# Patient Record
Sex: MALE | Race: WHITE | NOT HISPANIC OR LATINO | Employment: FULL TIME | URBAN - METROPOLITAN AREA
[De-identification: names, ages, dates, MRNs, and addresses within clinical notes are randomized per-mention and may not be internally consistent; named-entity substitution may affect disease eponyms.]

---

## 2023-12-14 DIAGNOSIS — N18.4 CHRONIC KIDNEY DISEASE, STAGE IV (SEVERE): ICD-10-CM

## 2023-12-14 DIAGNOSIS — Z01.818 OTHER SPECIFIED PRE-OPERATIVE EXAMINATION: Primary | ICD-10-CM

## 2023-12-14 DIAGNOSIS — N18.4 CHRONIC KIDNEY DISEASE, STAGE IV (SEVERE): Primary | ICD-10-CM

## 2023-12-14 RX ORDER — SEVELAMER CARBONATE 800 MG/1
800 TABLET, FILM COATED ORAL 3 TIMES DAILY
COMMUNITY
Start: 2023-11-20

## 2023-12-14 RX ORDER — ESZOPICLONE 3 MG/1
3 TABLET, FILM COATED ORAL NIGHTLY
COMMUNITY
Start: 2023-11-20

## 2023-12-14 RX ORDER — FLUOROMETHOLONE 1 MG/ML
1 SUSPENSION/ DROPS OPHTHALMIC 2 TIMES DAILY
COMMUNITY
Start: 2023-10-24

## 2023-12-14 RX ORDER — FINASTERIDE 5 MG/1
5 TABLET, FILM COATED ORAL DAILY
COMMUNITY
Start: 2023-10-18

## 2023-12-14 RX ORDER — LINAGLIPTIN 5 MG/1
5 TABLET, FILM COATED ORAL EVERY MORNING
COMMUNITY
Start: 2023-11-03

## 2023-12-14 RX ORDER — INSULIN DEGLUDEC 200 U/ML
20 INJECTION, SOLUTION SUBCUTANEOUS 3 TIMES DAILY
COMMUNITY
Start: 2023-09-14

## 2023-12-14 RX ORDER — DOXAZOSIN 2 MG/1
4 TABLET ORAL 2 TIMES DAILY
COMMUNITY
Start: 2023-12-06

## 2023-12-14 RX ORDER — FLASH GLUCOSE SENSOR
KIT MISCELLANEOUS
Status: ON HOLD | COMMUNITY
Start: 2023-11-22 | End: 2024-03-25 | Stop reason: CLARIF

## 2023-12-14 RX ORDER — AMLODIPINE BESYLATE 10 MG/1
10 TABLET ORAL DAILY
COMMUNITY
Start: 2023-09-21

## 2023-12-14 RX ORDER — HYDROXYZINE HYDROCHLORIDE 25 MG/1
25 TABLET, FILM COATED ORAL NIGHTLY
COMMUNITY
Start: 2023-11-20

## 2023-12-14 RX ORDER — METHYLPREDNISOLONE 4 MG/1
TABLET ORAL
COMMUNITY
Start: 2023-11-28 | End: 2023-12-26

## 2023-12-14 RX ORDER — CARVEDILOL 25 MG/1
25 TABLET ORAL 2 TIMES DAILY
COMMUNITY
Start: 2023-11-08

## 2023-12-14 RX ORDER — PATIROMER 8.4 G/1
8.4 POWDER, FOR SUSPENSION ORAL EVERY OTHER DAY
COMMUNITY
Start: 2023-12-11

## 2023-12-14 RX ORDER — ATORVASTATIN CALCIUM 40 MG/1
40 TABLET, FILM COATED ORAL NIGHTLY
COMMUNITY
Start: 2023-11-27

## 2023-12-14 RX ORDER — HYDROXYZINE PAMOATE 25 MG/1
25 CAPSULE ORAL 2 TIMES DAILY
COMMUNITY
Start: 2023-11-28

## 2023-12-14 RX ORDER — OLMESARTAN MEDOXOMIL 40 MG/1
40 TABLET ORAL DAILY
COMMUNITY
Start: 2023-10-25

## 2023-12-19 NOTE — PROGRESS NOTES
"      [facility] Vascular - Clinic Note  Ady Munoz MD      Patient Name: Leobardo Garcia                   : 1950     MRN: [id number]   Visit Date: 2023          History Present Illness     Reason for Visit: Dialysis Access Evaluation    Mr. Garcia presents to the clinic in need of permanent access creation for hemodialysis. He is being referred by his nephrologist, Dr. Berger. Most recent GFR is 11 (recently decreased from 15). He is not on hemodialysis at this time. Vein mapping of his left arm obtained today as he is right handed. Denies history of pacemaker/ICD or mediport. He is s/p cardiac stenting in 2016. He states he could walk approximately a mile without significant issues.      REVIEW OF SYSTEMS:  12 point review of systems conducted, negative except as stated in the history of present illness. See HPI for details.        Physical Exam      Vitals:    23 1341 23 1344   BP: (!) 195/75 (!) 187/81   BP Location: Right forearm Left arm   Pulse: 72 60   Weight:  109.8 kg (242 lb)   Height:  6' 4" (1.93 m)        General: well-nourished, no acute distress, and healthy appearing, ambulating normally, alert, pleasant, conversant, and oriented  Neurologic: cranial nerves are grossly intact, no neurologic deficits, no motor deficits, and no sensory deficits  HEENT: grossly normal and no scleral icterus  Neck/Chest: normal  and soft without lymphadenopathy  Respiratory: breathing easily and without respiratory distress  Abdomen: normal and soft  Cardiology: regular rate and rhythm    Upper Extremity Arterial Exam:   Right - radial is palpable  Left - radial is palpable    Dialysis Access:  none    Musculoskeletal:   Upper Extremity: normal bilateral hand function  Lower Extremity: trace edema present to bilateral lower extremities          Assessment and Plan     Mr. Garcia is a 73 y.o. with chronic kidney disease who needs permanent access creation. He is not currently on " dialysis, however most recent GFR is 11. Vein mapping obtained today demonstrates anatomy suitable for a LEFT ARM ACCESS CREATION- LIKELY BASILIC TRANSPOSITION. We discussed the risks and benefits of surgery at length including the risks of bleeding, infection, failure of access to mature, neurovascular injury, and/or steal syndrome. He wishes to proceed.         1. Chronic kidney disease, stage IV (severe)  - Ambulatory referral/consult to Vascular Surgery  - Basic Metabolic Panel; Future  - CBC Auto Differential; Future  - EKG 12-lead; Future  - X-Ray Chest PA And Lateral Pre-OP; Future           Imaging Obtained/Reviewed   Study: left upper extremity vein mapping  Date:   12/26/23           Medical History     Past Medical History:   Diagnosis Date    CAD (coronary artery disease)     CKD (chronic kidney disease)     Colon cancer     Diabetes     HTN (hypertension)     Tinnitus, unspecified ear      Past Surgical History:   Procedure Laterality Date    CATARACT EXTRACTION      COLON RESECTION       Family History   Problem Relation Age of Onset    Kidney disease Maternal Grandmother      Social History     Socioeconomic History    Marital status: Unknown   Tobacco Use    Smoking status: Every Day     Current packs/day: 0.50     Types: Cigarettes    Smokeless tobacco: Never     Current Outpatient Medications   Medication Instructions    amLODIPine (NORVASC) 10 mg, Oral    aspirin (ECOTRIN) 81 MG EC tablet 1 tab(s) orally once a day    atorvastatin (LIPITOR) 40 mg, Oral, Nightly    carvediloL (COREG) 25 mg, Oral, 2 times daily    doxazosin (CARDURA) 4 mg, Oral, 2 times daily    eszopiclone (LUNESTA) 3 mg, Oral, Nightly    finasteride (PROSCAR) 5 mg, Oral    fluorometholone 0.1% (FML) 0.1 % DrpS 1 drop, Both Eyes, 2 times daily    FREESTYLE ANG 2 SENSOR Kit CHANGE SENSOR EVERY 14 DAYS    hydrOXYzine HCL (ATARAX) 25 mg, Oral, Nightly    hydrOXYzine pamoate (VISTARIL) 25 mg, Oral, 4 times daily PRN    melatonin  (MELATIN) 5 mg, Oral, Nightly    olmesartan (BENICAR) 40 mg, Oral    sevelamer carbonate (RENVELA) 800 mg, Oral, 3 times daily    TRADJENTA 5 mg, Oral, Every morning    TRESIBA FLEXTOUCH U-200 200 unit/mL (3 mL) insulin pen SMARTSI Unit(s) SUB-Q Daily    VELTASSA 8.4 gram PwPk Oral     Review of patient's allergies indicates:   Allergen Reactions    Lisinopril Other (See Comments)     Other Reaction(s): Cough       Patient Care Team:  Bassam Birmingham III, MD as PCP - General (Family Medicine)      No follow-ups on file. In addition to their scheduled follow up, the patient has also been instructed to follow up on as needed basis.     No future appointments.

## 2023-12-19 NOTE — H&P (VIEW-ONLY)
"      [facility] Vascular - Clinic Note  Ady Munoz MD      Patient Name: Leobardo Garcia                   : 1950     MRN: [id number]   Visit Date: 2023          History Present Illness     Reason for Visit: Dialysis Access Evaluation    Mr. Garcia presents to the clinic in need of permanent access creation for hemodialysis. He is being referred by his nephrologist, Dr. Berger. Most recent GFR is 11 (recently decreased from 15). He is not on hemodialysis at this time. Vein mapping of his left arm obtained today as he is right handed. Denies history of pacemaker/ICD or mediport. He is s/p cardiac stenting in 2016. He states he could walk approximately a mile without significant issues.      REVIEW OF SYSTEMS:  12 point review of systems conducted, negative except as stated in the history of present illness. See HPI for details.        Physical Exam      Vitals:    23 1341 23 1344   BP: (!) 195/75 (!) 187/81   BP Location: Right forearm Left arm   Pulse: 72 60   Weight:  109.8 kg (242 lb)   Height:  6' 4" (1.93 m)        General: well-nourished, no acute distress, and healthy appearing, ambulating normally, alert, pleasant, conversant, and oriented  Neurologic: cranial nerves are grossly intact, no neurologic deficits, no motor deficits, and no sensory deficits  HEENT: grossly normal and no scleral icterus  Neck/Chest: normal  and soft without lymphadenopathy  Respiratory: breathing easily and without respiratory distress  Abdomen: normal and soft  Cardiology: regular rate and rhythm    Upper Extremity Arterial Exam:   Right - radial is palpable  Left - radial is palpable    Dialysis Access:  none    Musculoskeletal:   Upper Extremity: normal bilateral hand function  Lower Extremity: trace edema present to bilateral lower extremities          Assessment and Plan     Mr. Garcia is a 73 y.o. with chronic kidney disease who needs permanent access creation. He is not currently on " dialysis, however most recent GFR is 11. Vein mapping obtained today demonstrates anatomy suitable for a LEFT ARM ACCESS CREATION- LIKELY BASILIC TRANSPOSITION. We discussed the risks and benefits of surgery at length including the risks of bleeding, infection, failure of access to mature, neurovascular injury, and/or steal syndrome. He wishes to proceed.         1. Chronic kidney disease, stage IV (severe)  - Ambulatory referral/consult to Vascular Surgery  - Basic Metabolic Panel; Future  - CBC Auto Differential; Future  - EKG 12-lead; Future  - X-Ray Chest PA And Lateral Pre-OP; Future           Imaging Obtained/Reviewed   Study: left upper extremity vein mapping  Date:   12/26/23           Medical History     Past Medical History:   Diagnosis Date    CAD (coronary artery disease)     CKD (chronic kidney disease)     Colon cancer     Diabetes     HTN (hypertension)     Tinnitus, unspecified ear      Past Surgical History:   Procedure Laterality Date    CATARACT EXTRACTION      COLON RESECTION       Family History   Problem Relation Age of Onset    Kidney disease Maternal Grandmother      Social History     Socioeconomic History    Marital status: Unknown   Tobacco Use    Smoking status: Every Day     Current packs/day: 0.50     Types: Cigarettes    Smokeless tobacco: Never     Current Outpatient Medications   Medication Instructions    amLODIPine (NORVASC) 10 mg, Oral    aspirin (ECOTRIN) 81 MG EC tablet 1 tab(s) orally once a day    atorvastatin (LIPITOR) 40 mg, Oral, Nightly    carvediloL (COREG) 25 mg, Oral, 2 times daily    doxazosin (CARDURA) 4 mg, Oral, 2 times daily    eszopiclone (LUNESTA) 3 mg, Oral, Nightly    finasteride (PROSCAR) 5 mg, Oral    fluorometholone 0.1% (FML) 0.1 % DrpS 1 drop, Both Eyes, 2 times daily    FREESTYLE ANG 2 SENSOR Kit CHANGE SENSOR EVERY 14 DAYS    hydrOXYzine HCL (ATARAX) 25 mg, Oral, Nightly    hydrOXYzine pamoate (VISTARIL) 25 mg, Oral, 4 times daily PRN    melatonin  (MELATIN) 5 mg, Oral, Nightly    olmesartan (BENICAR) 40 mg, Oral    sevelamer carbonate (RENVELA) 800 mg, Oral, 3 times daily    TRADJENTA 5 mg, Oral, Every morning    TRESIBA FLEXTOUCH U-200 200 unit/mL (3 mL) insulin pen SMARTSI Unit(s) SUB-Q Daily    VELTASSA 8.4 gram PwPk Oral     Review of patient's allergies indicates:   Allergen Reactions    Lisinopril Other (See Comments)     Other Reaction(s): Cough       Patient Care Team:  Bassam Birmingham III, MD as PCP - General (Family Medicine)      No follow-ups on file. In addition to their scheduled follow up, the patient has also been instructed to follow up on as needed basis.     No future appointments.

## 2023-12-26 ENCOUNTER — OFFICE VISIT (OUTPATIENT)
Dept: VASCULAR SURGERY | Facility: CLINIC | Age: 73
End: 2023-12-26
Payer: MEDICARE

## 2023-12-26 VITALS
DIASTOLIC BLOOD PRESSURE: 81 MMHG | SYSTOLIC BLOOD PRESSURE: 187 MMHG | HEIGHT: 76 IN | BODY MASS INDEX: 29.47 KG/M2 | WEIGHT: 242 LBS | HEART RATE: 60 BPM

## 2023-12-26 DIAGNOSIS — N18.4 CHRONIC KIDNEY DISEASE, STAGE IV (SEVERE): ICD-10-CM

## 2023-12-26 DIAGNOSIS — N18.5 CHRONIC KIDNEY DISEASE, STAGE V: Primary | ICD-10-CM

## 2023-12-26 PROCEDURE — 99204 OFFICE O/P NEW MOD 45 MIN: CPT | Mod: ,,, | Performed by: SPECIALIST

## 2023-12-26 PROCEDURE — 99204 PR OFFICE/OUTPT VISIT, NEW, LEVL IV, 45-59 MIN: ICD-10-PCS | Mod: ,,, | Performed by: SPECIALIST

## 2023-12-26 RX ORDER — ASPIRIN 81 MG/1
81 TABLET ORAL DAILY
COMMUNITY

## 2023-12-26 RX ORDER — HYDROCODONE BITARTRATE AND ACETAMINOPHEN 7.5; 325 MG/1; MG/1
1 TABLET ORAL EVERY 6 HOURS PRN
Qty: 28 TABLET | Refills: 0 | Status: SHIPPED | OUTPATIENT
Start: 2023-12-26 | End: 2024-03-05

## 2023-12-26 RX ORDER — ACETAMINOPHEN 500 MG
5 TABLET ORAL NIGHTLY
COMMUNITY

## 2023-12-27 RX ORDER — SODIUM CHLORIDE 9 MG/ML
INJECTION, SOLUTION INTRAVENOUS CONTINUOUS
Status: CANCELLED | OUTPATIENT
Start: 2023-12-27

## 2024-01-10 ENCOUNTER — ANESTHESIA EVENT (OUTPATIENT)
Dept: SURGERY | Facility: HOSPITAL | Age: 74
End: 2024-01-10
Payer: COMMERCIAL

## 2024-01-10 RX ORDER — FOLIC ACID 0.8 MG
800 TABLET ORAL DAILY
COMMUNITY

## 2024-01-10 RX ORDER — ASCORBIC ACID 500 MG
500 TABLET ORAL DAILY
COMMUNITY

## 2024-01-10 RX ORDER — MULTIVIT WITH MINERALS/HERBS
1 TABLET ORAL DAILY
COMMUNITY

## 2024-01-10 RX ORDER — FUROSEMIDE 40 MG/1
80 TABLET ORAL DAILY PRN
COMMUNITY
End: 2024-04-09

## 2024-01-10 RX ORDER — CHOLECALCIFEROL (VITAMIN D3) 25 MCG
1000 TABLET ORAL DAILY
COMMUNITY

## 2024-01-17 ENCOUNTER — HOSPITAL ENCOUNTER (OUTPATIENT)
Facility: HOSPITAL | Age: 74
Discharge: HOME | End: 2024-01-17
Attending: SPECIALIST | Admitting: SPECIALIST
Payer: COMMERCIAL

## 2024-01-17 ENCOUNTER — ANESTHESIA (OUTPATIENT)
Dept: SURGERY | Facility: HOSPITAL | Age: 74
End: 2024-01-17
Payer: COMMERCIAL

## 2024-01-17 DIAGNOSIS — N18.5 CHRONIC KIDNEY DISEASE, STAGE V: ICD-10-CM

## 2024-01-17 LAB
ANION GAP SERPL CALC-SCNC: 13 MEQ/L
BUN SERPL-MCNC: 72.4 MG/DL (ref 8.4–25.7)
CALCIUM SERPL-MCNC: 8.2 MG/DL (ref 8.8–10)
CHLORIDE SERPL-SCNC: 112 MMOL/L (ref 98–107)
CO2 SERPL-SCNC: 22 MMOL/L (ref 23–31)
CREAT SERPL-MCNC: 6.1 MG/DL (ref 0.73–1.18)
CREAT/UREA NIT SERPL: 12
GFR SERPLBLD CREATININE-BSD FMLA CKD-EPI: 9 MLS/MIN/1.73/M2
GLUCOSE SERPL-MCNC: 122 MG/DL (ref 82–115)
POTASSIUM SERPL-SCNC: 4.5 MMOL/L (ref 3.5–5.1)
SODIUM SERPL-SCNC: 147 MMOL/L (ref 136–145)

## 2024-01-17 PROCEDURE — 63600175 PHARM REV CODE 636 W HCPCS: Performed by: ANESTHESIOLOGY

## 2024-01-17 PROCEDURE — 25000242 PHARM REV CODE 250 ALT 637 W/ HCPCS: Performed by: ANESTHESIOLOGY

## 2024-01-17 PROCEDURE — 37000009 HC ANESTHESIA EA ADD 15 MINS: Performed by: SPECIALIST

## 2024-01-17 PROCEDURE — 71000016 HC POSTOP RECOV ADDL HR: Performed by: SPECIALIST

## 2024-01-17 PROCEDURE — 80048 BASIC METABOLIC PNL TOTAL CA: CPT | Performed by: SPECIALIST

## 2024-01-17 PROCEDURE — 25000003 PHARM REV CODE 250: Performed by: ANESTHESIOLOGY

## 2024-01-17 PROCEDURE — 36821 AV FUSION DIRECT ANY SITE: CPT | Mod: LT,,, | Performed by: SPECIALIST

## 2024-01-17 PROCEDURE — D9220A PRA ANESTHESIA: Mod: CRNA,,, | Performed by: NURSE ANESTHETIST, CERTIFIED REGISTERED

## 2024-01-17 PROCEDURE — 71000015 HC POSTOP RECOV 1ST HR: Performed by: SPECIALIST

## 2024-01-17 PROCEDURE — 36000706: Performed by: SPECIALIST

## 2024-01-17 PROCEDURE — 76942 ECHO GUIDE FOR BIOPSY: CPT | Performed by: ANESTHESIOLOGY

## 2024-01-17 PROCEDURE — 36000707: Performed by: SPECIALIST

## 2024-01-17 PROCEDURE — 63600175 PHARM REV CODE 636 W HCPCS: Performed by: SPECIALIST

## 2024-01-17 PROCEDURE — 25000003 PHARM REV CODE 250: Performed by: SPECIALIST

## 2024-01-17 PROCEDURE — 37000008 HC ANESTHESIA 1ST 15 MINUTES: Performed by: SPECIALIST

## 2024-01-17 PROCEDURE — D9220A PRA ANESTHESIA: Mod: ANES,,, | Performed by: ANESTHESIOLOGY

## 2024-01-17 RX ORDER — TRAMADOL HYDROCHLORIDE 50 MG/1
50 TABLET ORAL
Status: COMPLETED | OUTPATIENT
Start: 2024-01-17 | End: 2024-01-17

## 2024-01-17 RX ORDER — IPRATROPIUM BROMIDE AND ALBUTEROL SULFATE 2.5; .5 MG/3ML; MG/3ML
3 SOLUTION RESPIRATORY (INHALATION)
Status: DISCONTINUED | OUTPATIENT
Start: 2024-01-17 | End: 2024-01-17 | Stop reason: HOSPADM

## 2024-01-17 RX ORDER — PROPOFOL 10 MG/ML
INJECTION, EMULSION INTRAVENOUS CONTINUOUS PRN
Status: DISCONTINUED | OUTPATIENT
Start: 2024-01-17 | End: 2024-01-17

## 2024-01-17 RX ORDER — PROTAMINE SULFATE 10 MG/ML
INJECTION, SOLUTION INTRAVENOUS
Status: DISCONTINUED | OUTPATIENT
Start: 2024-01-17 | End: 2024-01-17

## 2024-01-17 RX ORDER — PROPOFOL 10 MG/ML
VIAL (ML) INTRAVENOUS
Status: DISCONTINUED | OUTPATIENT
Start: 2024-01-17 | End: 2024-01-17

## 2024-01-17 RX ORDER — MEPERIDINE HYDROCHLORIDE 25 MG/ML
6.25 INJECTION INTRAMUSCULAR; INTRAVENOUS; SUBCUTANEOUS ONCE AS NEEDED
Status: CANCELLED | OUTPATIENT
Start: 2024-01-17 | End: 2024-01-18

## 2024-01-17 RX ORDER — CEFAZOLIN SODIUM 1 G/3ML
INJECTION, POWDER, FOR SOLUTION INTRAMUSCULAR; INTRAVENOUS
Status: DISCONTINUED | OUTPATIENT
Start: 2024-01-17 | End: 2024-01-17 | Stop reason: HOSPADM

## 2024-01-17 RX ORDER — HEPARIN SODIUM 5000 [USP'U]/ML
INJECTION, SOLUTION INTRAVENOUS; SUBCUTANEOUS
Status: DISCONTINUED | OUTPATIENT
Start: 2024-01-17 | End: 2024-01-17 | Stop reason: HOSPADM

## 2024-01-17 RX ORDER — ONDANSETRON HYDROCHLORIDE 2 MG/ML
4 INJECTION, SOLUTION INTRAVENOUS DAILY PRN
Status: CANCELLED | OUTPATIENT
Start: 2024-01-17

## 2024-01-17 RX ORDER — LIDOCAINE HYDROCHLORIDE 20 MG/ML
INJECTION INTRAVENOUS
Status: DISCONTINUED | OUTPATIENT
Start: 2024-01-17 | End: 2024-01-17

## 2024-01-17 RX ORDER — MIDAZOLAM HYDROCHLORIDE 1 MG/ML
2 INJECTION INTRAMUSCULAR; INTRAVENOUS ONCE AS NEEDED
Status: COMPLETED | OUTPATIENT
Start: 2024-01-17 | End: 2024-01-17

## 2024-01-17 RX ORDER — ROPIVACAINE HYDROCHLORIDE 5 MG/ML
30 INJECTION, SOLUTION EPIDURAL; INFILTRATION; PERINEURAL ONCE
Status: DISCONTINUED | OUTPATIENT
Start: 2024-01-17 | End: 2024-01-17 | Stop reason: HOSPADM

## 2024-01-17 RX ORDER — HYDROMORPHONE HYDROCHLORIDE 2 MG/ML
0.4 INJECTION, SOLUTION INTRAMUSCULAR; INTRAVENOUS; SUBCUTANEOUS EVERY 5 MIN PRN
Status: CANCELLED | OUTPATIENT
Start: 2024-01-17

## 2024-01-17 RX ORDER — HEPARIN SODIUM 1000 [USP'U]/ML
INJECTION, SOLUTION INTRAVENOUS; SUBCUTANEOUS
Status: DISCONTINUED | OUTPATIENT
Start: 2024-01-17 | End: 2024-01-17

## 2024-01-17 RX ORDER — SODIUM CHLORIDE, SODIUM GLUCONATE, SODIUM ACETATE, POTASSIUM CHLORIDE AND MAGNESIUM CHLORIDE 30; 37; 368; 526; 502 MG/100ML; MG/100ML; MG/100ML; MG/100ML; MG/100ML
INJECTION, SOLUTION INTRAVENOUS CONTINUOUS
Status: DISCONTINUED | OUTPATIENT
Start: 2024-01-17 | End: 2024-01-17 | Stop reason: HOSPADM

## 2024-01-17 RX ORDER — CEFAZOLIN SODIUM 2 G/50ML
2 SOLUTION INTRAVENOUS
Status: DISCONTINUED | OUTPATIENT
Start: 2024-01-17 | End: 2024-01-17 | Stop reason: HOSPADM

## 2024-01-17 RX ORDER — ACETAMINOPHEN 500 MG
1000 TABLET ORAL
Status: COMPLETED | OUTPATIENT
Start: 2024-01-17 | End: 2024-01-17

## 2024-01-17 RX ORDER — ROPIVACAINE HYDROCHLORIDE 5 MG/ML
INJECTION, SOLUTION EPIDURAL; INFILTRATION; PERINEURAL
Status: COMPLETED
Start: 2024-01-17 | End: 2024-01-17

## 2024-01-17 RX ORDER — HYDROCODONE BITARTRATE AND ACETAMINOPHEN 5; 325 MG/1; MG/1
1 TABLET ORAL
Status: CANCELLED | OUTPATIENT
Start: 2024-01-17

## 2024-01-17 RX ORDER — SODIUM CHLORIDE 9 MG/ML
INJECTION, SOLUTION INTRAVENOUS CONTINUOUS
Status: DISCONTINUED | OUTPATIENT
Start: 2024-01-17 | End: 2024-01-17 | Stop reason: HOSPADM

## 2024-01-17 RX ORDER — ROPIVACAINE HYDROCHLORIDE 5 MG/ML
INJECTION, SOLUTION EPIDURAL; INFILTRATION; PERINEURAL
Status: COMPLETED | OUTPATIENT
Start: 2024-01-17 | End: 2024-01-17

## 2024-01-17 RX ORDER — HYDROCODONE BITARTRATE AND ACETAMINOPHEN 5; 325 MG/1; MG/1
1 TABLET ORAL EVERY 4 HOURS PRN
Status: CANCELLED | OUTPATIENT
Start: 2024-01-17

## 2024-01-17 RX ORDER — LIDOCAINE HYDROCHLORIDE 10 MG/ML
1 INJECTION, SOLUTION EPIDURAL; INFILTRATION; INTRACAUDAL; PERINEURAL ONCE
Status: DISCONTINUED | OUTPATIENT
Start: 2024-01-17 | End: 2024-01-17 | Stop reason: HOSPADM

## 2024-01-17 RX ORDER — SODIUM CHLORIDE, SODIUM LACTATE, POTASSIUM CHLORIDE, CALCIUM CHLORIDE 600; 310; 30; 20 MG/100ML; MG/100ML; MG/100ML; MG/100ML
INJECTION, SOLUTION INTRAVENOUS CONTINUOUS
Status: DISCONTINUED | OUTPATIENT
Start: 2024-01-17 | End: 2024-01-17 | Stop reason: HOSPADM

## 2024-01-17 RX ORDER — ONDANSETRON 4 MG/1
4 TABLET, ORALLY DISINTEGRATING ORAL
Status: COMPLETED | OUTPATIENT
Start: 2024-01-17 | End: 2024-01-17

## 2024-01-17 RX ORDER — CEFAZOLIN 2 G/1
INJECTION, POWDER, FOR SOLUTION INTRAMUSCULAR; INTRAVENOUS
Status: DISCONTINUED | OUTPATIENT
Start: 2024-01-17 | End: 2024-01-17

## 2024-01-17 RX ORDER — PROCHLORPERAZINE EDISYLATE 5 MG/ML
5 INJECTION INTRAMUSCULAR; INTRAVENOUS EVERY 30 MIN PRN
Status: CANCELLED | OUTPATIENT
Start: 2024-01-17

## 2024-01-17 RX ADMIN — ROPIVACAINE HYDROCHLORIDE 30 ML: 5 INJECTION, SOLUTION EPIDURAL; INFILTRATION; PERINEURAL at 11:01

## 2024-01-17 RX ADMIN — PROPOFOL 20 MG: 10 INJECTION, EMULSION INTRAVENOUS at 01:01

## 2024-01-17 RX ADMIN — MIDAZOLAM HYDROCHLORIDE 2 MG: 1 INJECTION, SOLUTION INTRAMUSCULAR; INTRAVENOUS at 11:01

## 2024-01-17 RX ADMIN — PROPOFOL 25 MG: 10 INJECTION, EMULSION INTRAVENOUS at 12:01

## 2024-01-17 RX ADMIN — ACETAMINOPHEN 1000 MG: 500 TABLET ORAL at 09:01

## 2024-01-17 RX ADMIN — MIDAZOLAM 2 MG: 1 INJECTION INTRAMUSCULAR; INTRAVENOUS at 11:01

## 2024-01-17 RX ADMIN — HEPARIN SODIUM 4000 UNITS: 1000 INJECTION, SOLUTION INTRAVENOUS; SUBCUTANEOUS at 01:01

## 2024-01-17 RX ADMIN — PROPOFOL 75 MG: 10 INJECTION, EMULSION INTRAVENOUS at 12:01

## 2024-01-17 RX ADMIN — CEFAZOLIN 2 G: 2 INJECTION, POWDER, FOR SOLUTION INTRAMUSCULAR; INTRAVENOUS at 12:01

## 2024-01-17 RX ADMIN — TRAMADOL HYDROCHLORIDE 50 MG: 50 TABLET, COATED ORAL at 09:01

## 2024-01-17 RX ADMIN — PROTAMINE SULFATE 25 MG: 10 INJECTION, SOLUTION INTRAVENOUS at 01:01

## 2024-01-17 RX ADMIN — PROPOFOL 75 MCG/KG/MIN: 10 INJECTION, EMULSION INTRAVENOUS at 12:01

## 2024-01-17 RX ADMIN — IPRATROPIUM BROMIDE AND ALBUTEROL SULFATE 3 ML: .5; 3 SOLUTION RESPIRATORY (INHALATION) at 10:01

## 2024-01-17 RX ADMIN — ONDANSETRON 4 MG: 4 TABLET, ORALLY DISINTEGRATING ORAL at 09:01

## 2024-01-17 RX ADMIN — LIDOCAINE HYDROCHLORIDE 40 MG: 20 INJECTION INTRAVENOUS at 12:01

## 2024-01-17 RX ADMIN — LIDOCAINE HYDROCHLORIDE 75 MG: 20 INJECTION INTRAVENOUS at 12:01

## 2024-01-17 RX ADMIN — SODIUM CHLORIDE: 9 INJECTION, SOLUTION INTRAVENOUS at 12:01

## 2024-01-17 NOTE — DISCHARGE INSTRUCTIONS
-NO driving and no alcohol consumption for 24 hours and while taking narcotic pain medications.    -ELEVATE affected extremity as needed to aid in pain and inflammation.    -Keep site clean and dry for 48 hours. OK to shower afterwards. Do not submerge incision under water. Dermabond/steri-strips will fall off on its own time. Do not peel off.    -No heavy lifting with affected extremity. Do not lift objects greater than 10lbs.    -Monitor extremity for good circulation. If you received block, it can last 8-12 hours.    -Monitor incision for signs of infection: redness, swelling, drainage/pus/foul odor, fever, chills.    -Report to your nearest ER and/or notify your provider if you experience any SUDDEN/SEVERE chest/abdominal pain, weakness, trouble breathing, uncontrolled pain or bleeding  BLEEDING: If surgical site begins to bleed , contact your doctor or go to ER    NAUSEA: due to the anesthesia, you may experience nausea for up to 24 hours. If nausea and vomiting last longer, contact your doctor.     INFECTION:  watch for any signs or symptoms of infection such as chills, fever, redness or drainage at surgical site. Notify your doctor     PAIN : take your pain medications as directed. If the pain medications are not helping, notify your doctor.

## 2024-01-17 NOTE — TRANSFER OF CARE
"Anesthesia Transfer of Care Note    Patient: Leobardo Garcia    Procedure(s) Performed: Procedure(s) (LRB):  TRANSPOSITION, VEIN, BASILIC (Left)    Patient location: OPS    Anesthesia Type: regional    Transport from OR: Transported from OR on room air with adequate spontaneous ventilation    Post pain: adequate analgesia    Post assessment: no apparent anesthetic complications and tolerated procedure well    Post vital signs: stable    Level of consciousness: awake, alert and oriented    Nausea/Vomiting: no nausea/vomiting    Complications: none    Transfer of care protocol was followed      Last vitals: Visit Vitals  BP (!) 153/75   Pulse 65   Temp 36.3 °C (97.4 °F) (Oral)   Resp 16   Ht 6' 4" (1.93 m)   Wt 106.2 kg (234 lb 2.1 oz)   SpO2 95%   BMI 28.50 kg/m²     "

## 2024-01-17 NOTE — DISCHARGE SUMMARY
[facility] - Periop Services  Discharge Note  Short Stay    Procedure(s) (LRB):  TRANSPOSITION, VEIN, BASILIC (Left)      OUTCOME: Patient tolerated treatment/procedure well without complication and is now ready for discharge.    DISPOSITION: Home or Self Care    FINAL DIAGNOSIS:  Chronic kidney disease, stage V    FOLLOWUP: In clinic    DISCHARGE INSTRUCTIONS:    Discharge Procedure Orders   Diet general     Keep surgical extremity elevated     Wound care routine (specify)   Order Comments: Leave dermabond in place until it falls off;  Ok to wash with soap under running water but do not submerge.  Do not use antibiotics ointment.     Call MD for:  temperature >100.4     Call MD for:  redness, tenderness, or signs of infection (pain, swelling, redness, odor or green/yellow discharge around incision site)     Activity as tolerated        TIME SPENT ON DISCHARGE: 5 minutes

## 2024-01-17 NOTE — ANESTHESIA PREPROCEDURE EVALUATION
01/17/2024  Leobardo Garcia is a 73 y.o., male with ----------------------------  Acid reflux  CAD (coronary artery disease)  CKD (chronic kidney disease), stage V  Colon cancer  Diabetes  Fatigue  Heart attack  Heart murmur  HLD (hyperlipidemia)  HTN (hypertension)  Insomnia  Sleep apnea      Comment:  does not use Machine currently  Tinnitus, unspecified ear  Urinary urgency  Urine incontinence  Weakness    And ----------------------------  Cataract extraction  Colon resection  Colonoscopy  Coronary stent placement      Comment:  x3    Presents for left basilic vein transposition    Not yet on dialysis - did have some wheezing this morning in OPS so duoneb ordered - he does not use nebs at home    He feels well today and lives about 1.5 hours away, [facility]  Pre-op Assessment    I have reviewed the NPO Status.      Review of Systems  Cardiovascular:     Hypertension  Past MI CAD                  Coronary Artery Disease:          Hx of Myocardial Infarction                  Hypertension         Pulmonary:        Sleep Apnea     Obstructive Sleep Apnea (LAZARO).           Renal/:  Chronic Renal Disease        Kidney Function/Disease             Hepatic/GI:     GERD      Gerd          Endocrine:  Diabetes    Diabetes                         Latest Reference Range & Units 01/17/24 09:45   Sodium 136 - 145 mmol/L 147 (H)   Potassium 3.5 - 5.1 mmol/L 4.5   Chloride 98 - 107 mmol/L 112 (H)   CO2 23 - 31 mmol/L 22 (L)   Anion Gap mEq/L 13.0   BUN 8.4 - 25.7 mg/dL 72.4 (H)   Creatinine 0.73 - 1.18 mg/dL 6.10 (H)   BUN/CREAT RATIO  12   eGFR mls/min/1.73/m2 9   Glucose 82 - 115 mg/dL 122 (H)   Calcium 8.8 - 10.0 mg/dL 8.2 (L)   (H): Data is abnormally high  (L): Data is abnormally low    Physical Exam  General: Well nourished, Cooperative, Alert and Oriented    Airway:  Mallampati: III   Mouth Opening:  Normal  TM Distance: Normal  Tongue: Normal  Neck ROM: Normal ROM  Neck: Girth Increased    Dental:  Edentulous    Chest/Lungs:  Clear to auscultation, Normal Respiratory Rate  expiratory wheezes  Occasional expiratory wheeze on auscultation  Room Air saturations mid-90s so O2 added for block placemnet  Heart:  Rate: Normal  Rhythm: Regular Rhythm        Anesthesia Plan  Type of Anesthesia, risks & benefits discussed:    Anesthesia Type: Regional  Intra-op Monitoring Plan: Standard ASA Monitors  Post Op Pain Control Plan: multimodal analgesia, peripheral nerve block and IV/PO Opioids PRN  Induction:  IV  Informed Consent: Informed consent signed with the Patient and all parties understand the risks and agree with anesthesia plan.  All questions answered.   ASA Score: 3  Day of Surgery Review of History & Physical: H&P Update referred to the surgeon/provider.  Anesthesia Plan Notes: Premedication: Midazolam for block  Duoneb given in OPS - may need repeat neb prior to discharge  Regional: Supraclavicular vs interscalene nerve block for surgical anesthesia - Ropiv 0.5% 30mls  Special Technique: Preemptive analgesia with neurontin, zofran, acetaminophen and celebrex or tramadol  Nasal Cannula vs O2 via facemask- consider Supernova Nasal CPAP for obese or LAZARO patients  PONV prophylaxis  Pt may go directly to Phase 2 if criteria met    Ready For Surgery From Anesthesia Perspective.     .

## 2024-01-17 NOTE — ANESTHESIA PROCEDURE NOTES
Peripheral Block    Patient location during procedure: pre-op    Reason for block: primary anesthetic    Diagnosis: LUE Basilic vein tranposition   Start time: 1/17/2024 11:26 AM  Timeout: 1/17/2024 11:25 AM   End time: 1/17/2024 11:27 AM    Staffing  Authorizing Provider: Abigail Roa MD  Performing Provider: Abigail Roa MD    Staffing  Performed by: Abigail Roa MD  Authorized by: Abigail Roa MD    Preanesthetic Checklist  Completed: patient identified, IV checked, site marked, risks and benefits discussed, surgical consent, monitors and equipment checked, pre-op evaluation and timeout performed  Peripheral Block  Patient position: supine  Prep: ChloraPrep  Patient monitoring: heart rate, cardiac monitor, continuous pulse ox, continuous capnometry and frequent blood pressure checks  Block type: supraclavicular  Laterality: right  Injection technique: single shot  Needle  Needle type: Stimuplex   Needle gauge: 22 G  Needle length: 2 in  Needle localization: anatomical landmarks and ultrasound guidance   -ultrasound image captured on disc.  Assessment  Injection assessment: negative aspiration, negative parasthesia and local visualized surrounding nerve  Paresthesia pain: none  Heart rate change: no  Slow fractionated injection: yes  Pain Tolerance: comfortable throughout block and no complaints  Medications:    Medications: ropivacaine (NAROPIN) injection 0.5% - Perineural   30 mL - 1/17/2024 11:27:00 AM

## 2024-01-17 NOTE — PROGRESS NOTES
Notified Dr. Abigail Roa that pt has one small sip of coffee with creamer at 0845. Also, notified MD about wheezing. Duoneb ordered, proceeding with procedure as planned

## 2024-01-17 NOTE — OP NOTE
Surgeon: Ady Munoz MD    Date: 01/17/2024     Diagnosis: Pre-Op Diagnosis Codes:     * Chronic kidney disease, stage V [N18.5]     Procedure: Left radiocephalic fistula creation      History of Presenting Illness: Mr. Garcia is a 73 y.o. year old male who has chronic kidney disease and needs long term hemodialysis access.      Procedure:  The patient was taken to the operating room and placed in a supine position.  The Left arm was prepped and draped in the usual sterile fashion.  Antibiotics were administered and appropriate time-out was performed.  B-mode ultrasound was performed on the extremity.  An incision was made between the cephalic vein and the radial artery about 6-7cm proximal to the wrist (where vein was of good size).  Dissection was performed to identify the cephalic vein.  Side branches were ligated and the vein was mobilized.  It easily accepted a 4 mm dilator demonstrated a thrill with heparin saline injection.  The radial artery was identified and controlled.  Heparinization was performed and the vessel was clamped.  An end-to-side anastomosis was performed with running 6 0 Prolene suture.  Flow was restored to the system and there was a good thrill to the fistula.  Further dissection was performed to avoid tension on the fistula.  Hemostasis was obtained.  The wound was closed with 3-0 Vicryl suture and 4-0 Monocryl suture.  Dermabond was applied.    Complications:  none    Findings: Left forearm cephalic vein was 4-5mm in size and I chose to use that vessel.  Weak thrill but strong pulsation with distal compression at completion.

## 2024-01-18 VITALS
DIASTOLIC BLOOD PRESSURE: 72 MMHG | OXYGEN SATURATION: 93 % | WEIGHT: 234.13 LBS | SYSTOLIC BLOOD PRESSURE: 143 MMHG | TEMPERATURE: 97 F | HEIGHT: 76 IN | RESPIRATION RATE: 16 BRPM | HEART RATE: 56 BPM | BODY MASS INDEX: 28.51 KG/M2

## 2024-01-18 LAB — POCT GLUCOSE: 109 MG/DL (ref 70–110)

## 2024-01-18 NOTE — ANESTHESIA POSTPROCEDURE EVALUATION
Anesthesia Post Evaluation    Patient: Leobardo Garcia    Procedure(s) Performed: Procedure(s) (LRB):  TRANSPOSITION, VEIN, BASILIC (Left)    Final Anesthesia Type: regional      Patient location during evaluation: PACU  Patient participation: Yes- Able to Participate  Level of consciousness: awake and alert  Post-procedure vital signs: reviewed and stable  Pain management: adequate  Airway patency: patent    PONV status at discharge: vomiting (controlled) and nausea (controlled)  Anesthetic complications: no      Cardiovascular status: hemodynamically stable  Respiratory status: spontaneous ventilation and unassisted  Hydration status: euvolemic  Follow-up not needed (Resolving Peripheral Block).  Comments: Has resolving regional block for post op analgesia.                 Vitals Value Taken Time   /72 01/17/24 1456   Temp 96.5 01/18/24 0618   Pulse 56 01/17/24 1457   Resp 16 01/17/24 1359   SpO2 93 % 01/17/24 1457         No case tracking events are documented in the log.      Pain/Starr Score: Pain Rating Prior to Med Admin: 0 (1/17/2024  9:59 AM)  Starr Score: 10 (1/17/2024  3:05 PM)  Modified Starr Score: 20 (1/17/2024  3:05 PM)

## 2024-01-22 ENCOUNTER — TELEPHONE (OUTPATIENT)
Dept: VASCULAR SURGERY | Facility: CLINIC | Age: 74
End: 2024-01-22
Payer: COMMERCIAL

## 2024-01-22 NOTE — TELEPHONE ENCOUNTER
Leobardo Garcia is s/p left radiocephalic fistula creation on 1/17/2024. He was called regarding his post-operative status. He denies signs or symptoms of infection or with incision healing. He denies any numbness or pain to left hand. Follow up appointment was confirmed (2/29/2024). He was advised to call with any concerns or changes to incision or hand pain. He states understanding.

## 2024-02-29 NOTE — PROGRESS NOTES
"      [facility] Vascular - Clinic Note  Ady Munoz MD      Patient Name: Leobardo Garcia                   : 1950     MRN: [id number]   Visit Date: 3/5/2024          History Present Illness     Reason for Visit: Post-Operative Follow up     Mr. Garcia presents to the clinic for 6 week follow up s/p left radiocephalic fistula creation on 24. He denies fevers, drainage from his lef upper incision, and worsening pain or swelling at the site.  He denies numbness or fatigue to his left hand since surgery. He has no required initiation of hemodialysis. Most recent GFR in January was 9 which has declined and his most recent creatinine is 6.        REVIEW OF SYSTEMS:  12 point review of systems conducted, negative except as stated in the history of present illness. See HPI for details.        Physical Exam      Vitals:    24 1420   BP: (!) 180/71   BP Location: Right arm   Pulse: 67   Weight: 106.1 kg (234 lb)   Height: 6' 4" (1.93 m)        General: well-nourished, no acute distress, and healthy appearing, ambulating normally, alert, pleasant, conversant, and oriented  Respiratory: breathing easily and without respiratory distress  Abdomen: normal and soft  Cardiology: regular rate and rhythm    Upper Extremity Arterial Exam:   Left - radial is palpable    Dialysis Access:  left radiocephalic fistula  Assessment: good thrill  Sonosite Imaging:  proximal fistula - 6 mm diameter with 4.7 mm depth; middle fistula -  6.3 mm diameter with 4.3 mm depth; distal fistula - 7.1 mm diameter with 3.5 mm depth    Musculoskeletal:   Upper Extremity: normal left hand function  Lower extremity: pitting edema to lower extremity at ankle level          Assessment and Plan     Mr. Garcia is a 73 y.o. with chronic kidney disease who is s/p left radiocephalic fistula creation on 24. Duplex obtained today revealed adequate size and depth of fistula.  On exam, access has a good thrill and no pulsatility. It would " be optimal to postpone initiation cannulation for 2-3 weeks to allow for further vessel arterialization.  Advise starting with 17g needles for 2 weeks, then progress needle size as tolerated once dialysis is initiated. Recommend continued 3 months surveillance of access with us, however he can follow up prn if hemodialysis is initiated.        1. CKD (chronic kidney disease) stage 5, GFR less than 15 ml/min           Imaging Obtained/Reviewed   Study: none  Date:              Medical History     Past Medical History:   Diagnosis Date    Acid reflux     CAD (coronary artery disease)     CKD (chronic kidney disease), stage V     Colon cancer     Diabetes     Fatigue     Heart attack 2016    Heart murmur     HLD (hyperlipidemia)     HTN (hypertension)     Insomnia     Sleep apnea     does not use Machine currently    Tinnitus, unspecified ear     Urinary urgency     Urine incontinence     Weakness      Past Surgical History:   Procedure Laterality Date    CATARACT EXTRACTION Bilateral     COLON RESECTION  2006    COLONOSCOPY      CORONARY STENT PLACEMENT      x3    TRANSPOSITION OF BASILIC VEIN Left 1/17/2024    Procedure: TRANSPOSITION, VEIN, BASILIC;  Surgeon: Ady Munoz MD;  Location: [facility] OR;  Service: Peripheral Vascular;  Laterality: Left;  supraclavicular block     Family History   Problem Relation Age of Onset    Kidney disease Maternal Grandmother      Social History     Socioeconomic History    Marital status: Unknown   Tobacco Use    Smoking status: Every Day     Current packs/day: 0.50     Types: Cigarettes    Smokeless tobacco: Never   Substance and Sexual Activity    Alcohol use: Never    Drug use: Never     Current Outpatient Medications   Medication Instructions    amLODIPine (NORVASC) 10 mg, Oral, Daily    APPLE CIDER VINEGAR ORAL 450 mg, Oral, 2 times daily    ascorbic acid (vitamin C) (VITAMIN C) 500 mg, Oral, Daily    aspirin (ECOTRIN) 81 mg, Oral, Daily    aspirin-sod bicarb-citric acid  (MARCIA-SELTZER) 324 mg TbEF 325 mg, Oral, Every 6 hours PRN    atorvastatin (LIPITOR) 40 mg, Oral, Nightly    b complex vitamins tablet 1 tablet, Oral, Daily    carvediloL (COREG) 25 mg, Oral, 2 times daily    doxazosin (CARDURA) 4 mg, Oral, 2 times daily    doxycycline (VIBRAMYCIN) 100 mg, Oral, 2 times daily    empagliflozin (JARDIANCE) 10 mg, Oral, Daily    eszopiclone (LUNESTA) 3 mg, Oral, Nightly    finasteride (PROSCAR) 5 mg, Oral, Daily    fluorometholone 0.1% (FML) 0.1 % DrpS 1 drop, Both Eyes, 2 times daily    folic acid (FOLVITE) 800 mcg, Oral, Daily    FREESTYLE ANG 2 SENSOR Kit CHANGE SENSOR EVERY 14 DAYS    furosemide (LASIX) 40 mg, Oral, Daily PRN    hydrOXYzine HCL (ATARAX) 25 mg, Oral, Nightly    hydrOXYzine pamoate (VISTARIL) 25 mg, Oral, 4 times daily PRN    melatonin (MELATIN) 5 mg, Oral, Nightly    olmesartan (BENICAR) 40 mg, Oral, Daily    PAXLOVID 150-100 mg DsPk FOLLOW PACKAGE DIRECTIONS    sevelamer carbonate (RENVELA) 800 mg, Oral, 3 times daily    TRADJENTA 5 mg, Oral, Every morning    TRESIBA FLEXTOUCH U-200 20 Units, Subcutaneous, 3 times daily    VELTASSA 8.4 g, Oral, Every other day    vitamin D (VITAMIN D3) 1,000 Units, Oral, Daily     Review of patient's allergies indicates:   Allergen Reactions    Lisinopril Other (See Comments)     Other Reaction(s): Cough       Patient Care Team:  Bassam Birmingham III, MD as PCP - General (Family Medicine)  Ian Berger MD (Nephrology)  Keily Spann MD (Cardiovascular Disease)      No follow-ups on file. In addition to their scheduled follow up, the patient has also been instructed to follow up on as needed basis.     Future Appointments   Date Time Provider Department Center   6/11/2024 10:40 AM Ady Munoz MD [facility]

## 2024-03-05 ENCOUNTER — OFFICE VISIT (OUTPATIENT)
Dept: VASCULAR SURGERY | Facility: CLINIC | Age: 74
End: 2024-03-05
Payer: MEDICARE

## 2024-03-05 VITALS
BODY MASS INDEX: 28.49 KG/M2 | HEIGHT: 76 IN | SYSTOLIC BLOOD PRESSURE: 180 MMHG | HEART RATE: 67 BPM | WEIGHT: 234 LBS | DIASTOLIC BLOOD PRESSURE: 71 MMHG

## 2024-03-05 DIAGNOSIS — N18.5 CKD (CHRONIC KIDNEY DISEASE) STAGE 5, GFR LESS THAN 15 ML/MIN: Primary | ICD-10-CM

## 2024-03-05 PROCEDURE — 99024 POSTOP FOLLOW-UP VISIT: CPT | Mod: POP,,, | Performed by: SPECIALIST

## 2024-03-05 RX ORDER — DOXYCYCLINE 100 MG/1
100 CAPSULE ORAL 2 TIMES DAILY
Status: ON HOLD | COMMUNITY
Start: 2024-01-31 | End: 2024-03-25 | Stop reason: HOSPADM

## 2024-03-05 RX ORDER — NIRMATRELVIR AND RITONAVIR 150-100 MG
KIT ORAL
COMMUNITY
Start: 2024-01-02 | End: 2024-03-15

## 2024-03-11 DIAGNOSIS — N18.5 CHRONIC KIDNEY DISEASE (CKD), STAGE V: Primary | ICD-10-CM

## 2024-03-15 ENCOUNTER — OFFICE VISIT (OUTPATIENT)
Dept: SURGERY | Facility: CLINIC | Age: 74
End: 2024-03-15
Payer: COMMERCIAL

## 2024-03-15 ENCOUNTER — HOSPITAL ENCOUNTER (OUTPATIENT)
Dept: RADIOLOGY | Facility: HOSPITAL | Age: 74
Discharge: HOME | End: 2024-03-15
Attending: NURSE PRACTITIONER
Payer: COMMERCIAL

## 2024-03-15 VITALS
HEART RATE: 61 BPM | DIASTOLIC BLOOD PRESSURE: 58 MMHG | BODY MASS INDEX: 29.1 KG/M2 | SYSTOLIC BLOOD PRESSURE: 164 MMHG | WEIGHT: 239 LBS | HEIGHT: 76 IN

## 2024-03-15 DIAGNOSIS — K42.9 UMBILICAL HERNIA WITHOUT OBSTRUCTION AND WITHOUT GANGRENE: ICD-10-CM

## 2024-03-15 DIAGNOSIS — Z01.818 PRE-OPERATIVE EXAMINATION: Primary | ICD-10-CM

## 2024-03-15 DIAGNOSIS — N18.5 CHRONIC KIDNEY DISEASE (CKD), STAGE V: ICD-10-CM

## 2024-03-15 DIAGNOSIS — E66.3 OVERWEIGHT (BMI 25.0-29.9): ICD-10-CM

## 2024-03-15 DIAGNOSIS — E11.21 TYPE 2 DIABETES MELLITUS WITH DIABETIC NEPHROPATHY, WITHOUT LONG-TERM CURRENT USE OF INSULIN: ICD-10-CM

## 2024-03-15 DIAGNOSIS — Z01.818 PRE-OPERATIVE EXAMINATION: ICD-10-CM

## 2024-03-15 PROCEDURE — 3008F BODY MASS INDEX DOCD: CPT | Mod: CPTII,,, | Performed by: SURGERY

## 2024-03-15 PROCEDURE — 1159F MED LIST DOCD IN RCRD: CPT | Mod: CPTII,,, | Performed by: SURGERY

## 2024-03-15 PROCEDURE — 3077F SYST BP >= 140 MM HG: CPT | Mod: CPTII,,, | Performed by: SURGERY

## 2024-03-15 PROCEDURE — 71045 X-RAY EXAM CHEST 1 VIEW: CPT | Mod: TC

## 2024-03-15 PROCEDURE — 99204 OFFICE O/P NEW MOD 45 MIN: CPT | Mod: ,,, | Performed by: SURGERY

## 2024-03-15 PROCEDURE — 3078F DIAST BP <80 MM HG: CPT | Mod: CPTII,,, | Performed by: SURGERY

## 2024-03-15 PROCEDURE — 4010F ACE/ARB THERAPY RXD/TAKEN: CPT | Mod: CPTII,,, | Performed by: SURGERY

## 2024-03-15 NOTE — H&P (VIEW-ONLY)
Patient ID: [id number]     Chief Complaint: Consult (Peritoneal dialysis cath )    HPI:     Leobardo Garcia is a 73 y.o. male here today for Peritoneal dialysis catheter placement after referral from nephrologist, Dr. Berger. He is not currently on hemodialysis and would much prefer peritoneal dialysis due to the flexibility of it. Pt does have L lower arm fistula.     Dialysis days: none currently   Access: LLE fistula    Patient Care Team:  Bassam Birmingham III, MD as PCP - General (Family Medicine)  Ian Berger MD (Nephrology)  Keily Spann MD (Cardiovascular Disease)  Theron Cantu MD as Surgeon (General Surgery)     Past Medical History:   Diagnosis Date    Acid reflux     CAD (coronary artery disease)     CKD (chronic kidney disease), stage V     Colon cancer     Diabetes     Fatigue     Heart attack 2016    Heart murmur     HLD (hyperlipidemia)     HTN (hypertension)     Insomnia     Sleep apnea     does not use Machine currently    Tinnitus, unspecified ear     Urinary urgency     Urine incontinence     Weakness       Past Surgical History:   Procedure Laterality Date    CATARACT EXTRACTION Bilateral     COLON RESECTION  2006    COLONOSCOPY      CORONARY STENT PLACEMENT      x3    TRANSPOSITION OF BASILIC VEIN Left 1/17/2024    Procedure: TRANSPOSITION, VEIN, BASILIC;  Surgeon: Ady Munoz MD;  Location: [facility];  Service: Peripheral Vascular;  Laterality: Left;  supraclavicular block      Social History     Tobacco Use    Smoking status: Every Day     Current packs/day: 0.50     Types: Cigarettes    Smokeless tobacco: Never   Substance and Sexual Activity    Alcohol use: Never    Drug use: Never    Sexual activity: Not on file      Current Outpatient Medications   Medication Instructions    amLODIPine (NORVASC) 10 mg, Oral, Daily    APPLE CIDER VINEGAR ORAL 450 mg, Oral, 2 times daily    ascorbic acid (vitamin C) (VITAMIN C) 500 mg, Oral, Daily    aspirin  "(ECOTRIN) 81 mg, Oral, Daily    aspirin-sod bicarb-citric acid (MARCIA-SELTZER) 324 mg TbEF 325 mg, Oral, Every 6 hours PRN    atorvastatin (LIPITOR) 40 mg, Oral, Nightly    b complex vitamins tablet 1 tablet, Oral, Daily    carvediloL (COREG) 25 mg, Oral, 2 times daily    doxazosin (CARDURA) 4 mg, Oral, 2 times daily    doxycycline (VIBRAMYCIN) 100 mg, Oral, 2 times daily    empagliflozin (JARDIANCE) 10 mg, Oral, Daily    eszopiclone (LUNESTA) 3 mg, Oral, Nightly    finasteride (PROSCAR) 5 mg, Oral, Daily    fluorometholone 0.1% (FML) 0.1 % DrpS 1 drop, Both Eyes, 2 times daily    folic acid (FOLVITE) 800 mcg, Oral, Daily    FREESTYLE ANG 2 SENSOR Kit CHANGE SENSOR EVERY 14 DAYS    furosemide (LASIX) 80 mg, Oral, Daily PRN    hydrOXYzine HCL (ATARAX) 25 mg, Oral, Nightly    hydrOXYzine pamoate (VISTARIL) 25 mg, Oral, 4 times daily PRN    melatonin (MELATIN) 5 mg, Oral, Nightly    olmesartan (BENICAR) 40 mg, Oral, Daily    sevelamer carbonate (RENVELA) 800 mg, Oral, 3 times daily    TRADJENTA 5 mg, Oral, Every morning    TRESIBA FLEXTOUCH U-200 20 Units, Subcutaneous, 3 times daily    VELTASSA 8.4 g, Oral, Every other day    vitamin D (VITAMIN D3) 1,000 Units, Oral, Daily     Review of patient's allergies indicates:   Allergen Reactions    Lisinopril Other (See Comments)     Other Reaction(s): Cough        Subjective:     Review of Systems    12 point review of systems conducted, negative except as stated in the history of present illness. See HPI for details.    Objective:     Visit Vitals  BP (!) 164/58   Pulse 61   Ht 6' 4" (1.93 m)   Wt 108.4 kg (239 lb)   BMI 29.09 kg/m²       Physical Exam:  General:  Alert and oriented. Pale   Respiratory:  Lungs are clear to auscultation, Respirations are non-labored, Breath sounds are equal.    Cardiovascular:  Normal rate, Regular rhythm, No murmur.    Gastrointestinal:  Soft, Non-tender, Non-distended, Normal bowel sounds. 4cm umbilical hernia " defect, reducible, non-tender   Musculoskeletal:  Normal range of motion, Normal strength.    Integumentary:  Warm, Dry, Pink.   Neurologic:  Alert, Oriented.    Psychiatric:  Cooperative.      Assessment:       ICD-10-CM ICD-9-CM   1. Pre-operative examination  Z01.818 V72.84   2. Chronic kidney disease (CKD), stage V  N18.5 585.5   3. Umbilical hernia without obstruction and without gangrene  K42.9 553.1   4. Overweight (BMI 25.0-29.9)  E66.3 278.02   5. Type 2 diabetes mellitus with diabetic nephropathy, without long-term current use of insulin  E11.21 250.40     583.81      Plan:     1. Pre-operative examination  -     X-Ray Chest 1 View; Future; Expected date: 03/15/2024  -     EKG 12-lead; Future; Expected date: 03/15/2024  -     Comprehensive Metabolic Panel; Future; Expected date: 03/15/2024  -     CBC Auto Differential; Future; Expected date: 03/15/2024    2. Chronic kidney disease (CKD), stage V  -     Ambulatory referral/consult to General Surgery    3. Umbilical hernia without obstruction and without gangrene    4. Overweight (BMI 25.0-29.9)    5. Type 2 diabetes mellitus with diabetic nephropathy, without long-term current use of insulin      Counseled:  Patient, Regarding diagnosis, Regarding treatment  What is PD cath: During peritoneal dialysis, a cleansing fluid flows through a tube (catheter) into part of your abdomen. The lining of your abdomen (peritoneum) acts as a filter and removes waste products from your blood. After a set period of time, the fluid with the filtered waste products flows out of your abdomen and is discarded. peritoneal dialysis isn't an option for everyone with kidney failure. You need manual dexterity and the ability to care for yourself at home, or you need a reliable caregiver.    Risks: Cathether failure,  dialysate leakage, exit-site infections, hernias, genital oedema, inadequate dialysis, possible need for revisional surgery, weight gain, and other  discomforts  Benefits: improved mobility, lower costs, less dietary restrictions and preservation of residual renal function  Alternative: hemodialysis fistual, graft placed by vascular surgeon.   If patient refuses: decline of kidney function, swelling, and other ailments of untreated CKD.       Professional Services   - Plan for placement of PD cath on 3/25/24 at [facility] with one night hospital stay.   - need cardiac clearance from Dr. Keily Spann .  - complete preop work up   - chest xray, CBC, BMP, EKG  - Dr. Cantu explained the risks and benefits of the surgical placement of PD catheter and lap ventral hernia repair. Pt verbalized understanding and wishes to proceed    Priscila MILTON FNP, paige scribing for, and in the presence of, Theron Cantu MD, performed the above scribed service.       Future Appointments   Date Time Provider Department Center   6/11/2024 10:40 AM Ady Munoz MD [facility]      AMARILIS Lock

## 2024-03-15 NOTE — PROGRESS NOTES
Patient ID: [id number]     Chief Complaint: Consult (Peritoneal dialysis cath )    HPI:     Leobardo Garcia is a 73 y.o. male here today for Peritoneal dialysis catheter placement after referral from nephrologist, Dr. Berger. He is not currently on hemodialysis and would much prefer peritoneal dialysis due to the flexibility of it. Pt does have L lower arm fistula.     Dialysis days: none currently   Access: LLE fistula    Patient Care Team:  Bassam Birmingham III, MD as PCP - General (Family Medicine)  Ian Berger MD (Nephrology)  Keily Spann MD (Cardiovascular Disease)  Theron Cantu MD as Surgeon (General Surgery)     Past Medical History:   Diagnosis Date    Acid reflux     CAD (coronary artery disease)     CKD (chronic kidney disease), stage V     Colon cancer     Diabetes     Fatigue     Heart attack 2016    Heart murmur     HLD (hyperlipidemia)     HTN (hypertension)     Insomnia     Sleep apnea     does not use Machine currently    Tinnitus, unspecified ear     Urinary urgency     Urine incontinence     Weakness       Past Surgical History:   Procedure Laterality Date    CATARACT EXTRACTION Bilateral     COLON RESECTION  2006    COLONOSCOPY      CORONARY STENT PLACEMENT      x3    TRANSPOSITION OF BASILIC VEIN Left 1/17/2024    Procedure: TRANSPOSITION, VEIN, BASILIC;  Surgeon: Ady Munoz MD;  Location: [facility];  Service: Peripheral Vascular;  Laterality: Left;  supraclavicular block      Social History     Tobacco Use    Smoking status: Every Day     Current packs/day: 0.50     Types: Cigarettes    Smokeless tobacco: Never   Substance and Sexual Activity    Alcohol use: Never    Drug use: Never    Sexual activity: Not on file      Current Outpatient Medications   Medication Instructions    amLODIPine (NORVASC) 10 mg, Oral, Daily    APPLE CIDER VINEGAR ORAL 450 mg, Oral, 2 times daily    ascorbic acid (vitamin C) (VITAMIN C) 500 mg, Oral, Daily    aspirin  "(ECOTRIN) 81 mg, Oral, Daily    aspirin-sod bicarb-citric acid (MARCIA-SELTZER) 324 mg TbEF 325 mg, Oral, Every 6 hours PRN    atorvastatin (LIPITOR) 40 mg, Oral, Nightly    b complex vitamins tablet 1 tablet, Oral, Daily    carvediloL (COREG) 25 mg, Oral, 2 times daily    doxazosin (CARDURA) 4 mg, Oral, 2 times daily    doxycycline (VIBRAMYCIN) 100 mg, Oral, 2 times daily    empagliflozin (JARDIANCE) 10 mg, Oral, Daily    eszopiclone (LUNESTA) 3 mg, Oral, Nightly    finasteride (PROSCAR) 5 mg, Oral, Daily    fluorometholone 0.1% (FML) 0.1 % DrpS 1 drop, Both Eyes, 2 times daily    folic acid (FOLVITE) 800 mcg, Oral, Daily    FREESTYLE ANG 2 SENSOR Kit CHANGE SENSOR EVERY 14 DAYS    furosemide (LASIX) 80 mg, Oral, Daily PRN    hydrOXYzine HCL (ATARAX) 25 mg, Oral, Nightly    hydrOXYzine pamoate (VISTARIL) 25 mg, Oral, 4 times daily PRN    melatonin (MELATIN) 5 mg, Oral, Nightly    olmesartan (BENICAR) 40 mg, Oral, Daily    sevelamer carbonate (RENVELA) 800 mg, Oral, 3 times daily    TRADJENTA 5 mg, Oral, Every morning    TRESIBA FLEXTOUCH U-200 20 Units, Subcutaneous, 3 times daily    VELTASSA 8.4 g, Oral, Every other day    vitamin D (VITAMIN D3) 1,000 Units, Oral, Daily     Review of patient's allergies indicates:   Allergen Reactions    Lisinopril Other (See Comments)     Other Reaction(s): Cough        Subjective:     Review of Systems    12 point review of systems conducted, negative except as stated in the history of present illness. See HPI for details.    Objective:     Visit Vitals  BP (!) 164/58   Pulse 61   Ht 6' 4" (1.93 m)   Wt 108.4 kg (239 lb)   BMI 29.09 kg/m²       Physical Exam:  General:  Alert and oriented. Pale   Respiratory:  Lungs are clear to auscultation, Respirations are non-labored, Breath sounds are equal.    Cardiovascular:  Normal rate, Regular rhythm, No murmur.    Gastrointestinal:  Soft, Non-tender, Non-distended, Normal bowel sounds. 4cm umbilical hernia " defect, reducible, non-tender   Musculoskeletal:  Normal range of motion, Normal strength.    Integumentary:  Warm, Dry, Pink.   Neurologic:  Alert, Oriented.    Psychiatric:  Cooperative.      Assessment:       ICD-10-CM ICD-9-CM   1. Pre-operative examination  Z01.818 V72.84   2. Chronic kidney disease (CKD), stage V  N18.5 585.5   3. Umbilical hernia without obstruction and without gangrene  K42.9 553.1   4. Overweight (BMI 25.0-29.9)  E66.3 278.02   5. Type 2 diabetes mellitus with diabetic nephropathy, without long-term current use of insulin  E11.21 250.40     583.81      Plan:     1. Pre-operative examination  -     X-Ray Chest 1 View; Future; Expected date: 03/15/2024  -     EKG 12-lead; Future; Expected date: 03/15/2024  -     Comprehensive Metabolic Panel; Future; Expected date: 03/15/2024  -     CBC Auto Differential; Future; Expected date: 03/15/2024    2. Chronic kidney disease (CKD), stage V  -     Ambulatory referral/consult to General Surgery    3. Umbilical hernia without obstruction and without gangrene    4. Overweight (BMI 25.0-29.9)    5. Type 2 diabetes mellitus with diabetic nephropathy, without long-term current use of insulin      Counseled:  Patient, Regarding diagnosis, Regarding treatment  What is PD cath: During peritoneal dialysis, a cleansing fluid flows through a tube (catheter) into part of your abdomen. The lining of your abdomen (peritoneum) acts as a filter and removes waste products from your blood. After a set period of time, the fluid with the filtered waste products flows out of your abdomen and is discarded. peritoneal dialysis isn't an option for everyone with kidney failure. You need manual dexterity and the ability to care for yourself at home, or you need a reliable caregiver.    Risks: Cathether failure,  dialysate leakage, exit-site infections, hernias, genital oedema, inadequate dialysis, possible need for revisional surgery, weight gain, and other  discomforts  Benefits: improved mobility, lower costs, less dietary restrictions and preservation of residual renal function  Alternative: hemodialysis fistual, graft placed by vascular surgeon.   If patient refuses: decline of kidney function, swelling, and other ailments of untreated CKD.       Professional Services   - Plan for placement of PD cath on 3/25/24 at [facility] with one night hospital stay.   - need cardiac clearance from Dr. Keily Spann .  - complete preop work up   - chest xray, CBC, BMP, EKG  - Dr. Cantu explained the risks and benefits of the surgical placement of PD catheter and lap ventral hernia repair. Pt verbalized understanding and wishes to proceed    Priscila MILTON FNP, paige scribing for, and in the presence of, Theron Cantu MD, performed the above scribed service.       Future Appointments   Date Time Provider Department Center   6/11/2024 10:40 AM Ady uMnoz MD [facility]      AMARILIS Lock

## 2024-03-18 ENCOUNTER — TELEPHONE (OUTPATIENT)
Dept: SURGERY | Facility: CLINIC | Age: 74
End: 2024-03-18
Payer: COMMERCIAL

## 2024-03-18 NOTE — TELEPHONE ENCOUNTER
Cards clearance requested     ----- Message from AMARILIS Lock sent at 3/15/2024 11:17 AM CDT -----  Regarding: cardiac clearnce  1. Cardiac clearance Dr. Keily Spann

## 2024-03-20 ENCOUNTER — ANESTHESIA EVENT (OUTPATIENT)
Dept: SURGERY | Facility: HOSPITAL | Age: 74
End: 2024-03-20
Payer: COMMERCIAL

## 2024-03-21 ENCOUNTER — TELEPHONE (OUTPATIENT)
Dept: SURGERY | Facility: CLINIC | Age: 74
End: 2024-03-21
Payer: COMMERCIAL

## 2024-03-21 NOTE — TELEPHONE ENCOUNTER
Called pt and spoke with his wife regarding his ASA. She states he hasn't taken it since Sat. 3/16/24. Pt is cleared for surgery per cards. Cards clearance scanned in media

## 2024-03-22 NOTE — PRE-PROCEDURE INSTRUCTIONS
"[facility]: Outpatient Surgery  Preprocedure Instructions    Expectations: "Because of inconsistent procedure completion times, an unexpected wait may occur. The physicians would like you to be here to prepare in the event they run ahead of time. We will make you as comfortable as possible and keep you informed. We apologize in advance if this happens."     Your arrival time for your surgery or procedure is _7:30 AM_____.  We ask patients to arrive about 2 hours before surgery to allow for enough time to review your health history & medications, start your IV, complete any outstanding labwork or tests, and meet your Anesthesiologist.    We are located at [facility], [address] [place] [address].    Enter through the [facility] entrance next to the Emergency Room, and come to the 6th floor to the Outpatient Surgery Department.    If you are in need of a wheelchair the morning of surgery please call [phone] about 15 minutes before you arrive. Parking is available in our parking garage located off [facility], between the hospital and [facility].      Visitory Policy:  You are allowed 2 adult visitors to be with you in the hospital. Please, no switching visitors in pre-op area. All hospital visitors should be in good current health.  No small children.  We will update you and your family hourly on the progression of surgery and any unexpected delays.      What to Bring:  Please have your ID, insurance cards, and all home medication bottles with you at check in.  Bring your CPAP machine if one is used at home.     Fasting:  Nothing to eat or drink after midnight the night before your procedure. This includes no ice, gum, hard candies, and/or tobacco products.    Medications:  Follow your doctor's instructions for taking any medications on the morning of your procedure.  If no instructions for taking medications were given, do not take any medications but bring your " medications in their bottles to your procedure check in.     Follow your doctor's preoperative instructions regarding skin prep, bowel prep, bathing, or showering prior to your procedure.  If any special soaps were provided to you, please use according to your doctor's instructions. If no instructions were given from your doctor, take a good bath or shower with antibacterial soap the night before and the morning of your procedure.  On the morning of procedure, wear loose, comfortable clothing.  No lotions, makeup, perfumes, colognes, deodorant, or jewelry to your procedure.  Removable items (glasses, contact lenses, dentures, retainers, hearing aids) need to be removed for your procedure.  Bring your storage containers for these items if you wear them.     Artificial nails, body jewelry, eyelash extensions, and/or hair extensions with metal clips are not allowed during your surgery.  If you currently wear any of these items, please arrange for them to be removed prior to your arrival to the hospital.     Outpatient or Same Day Surgeries:  Any patients receiving sedation/anesthesia are advised not to drive for 24 hours after their procedure.  We do not allow patients to drive themselves home after discharge.  If you are going home after your procedure, please have someone available to drive you home from the hospital.     You may call the Outpatient Surgery Department at [phone] with any questions or concerns.  We are looking forward to meeting you and taking great care of you for your procedure.  Thank you for choosing [facility] for your surgical needs.

## 2024-03-25 ENCOUNTER — HOSPITAL ENCOUNTER (OUTPATIENT)
Facility: HOSPITAL | Age: 74
Discharge: HOME | End: 2024-03-25
Attending: SURGERY | Admitting: SURGERY
Payer: COMMERCIAL

## 2024-03-25 ENCOUNTER — ANESTHESIA (OUTPATIENT)
Dept: SURGERY | Facility: HOSPITAL | Age: 74
End: 2024-03-25
Payer: COMMERCIAL

## 2024-03-25 DIAGNOSIS — N18.6 CKD (CHRONIC KIDNEY DISEASE) STAGE V REQUIRING CHRONIC DIALYSIS: ICD-10-CM

## 2024-03-25 DIAGNOSIS — Z99.2 CKD (CHRONIC KIDNEY DISEASE) STAGE V REQUIRING CHRONIC DIALYSIS: ICD-10-CM

## 2024-03-25 DIAGNOSIS — N18.5 CHRONIC KIDNEY DISEASE, STAGE V: Primary | ICD-10-CM

## 2024-03-25 DIAGNOSIS — K42.9 UMBILICAL HERNIA WITHOUT OBSTRUCTION AND WITHOUT GANGRENE: ICD-10-CM

## 2024-03-25 LAB
ANION GAP SERPL CALC-SCNC: 13 MEQ/L
BASOPHILS # BLD AUTO: 0.07 X10(3)/MCL
BASOPHILS NFR BLD AUTO: 0.8 %
BUN SERPL-MCNC: 62.5 MG/DL (ref 8.4–25.7)
CALCIUM SERPL-MCNC: 8.6 MG/DL (ref 8.8–10)
CHLORIDE SERPL-SCNC: 108 MMOL/L (ref 98–107)
CO2 SERPL-SCNC: 21 MMOL/L (ref 23–31)
CREAT SERPL-MCNC: 6.62 MG/DL (ref 0.73–1.18)
CREAT/UREA NIT SERPL: 9
EOSINOPHIL # BLD AUTO: 0.88 X10(3)/MCL (ref 0–0.9)
EOSINOPHIL NFR BLD AUTO: 9.7 %
ERYTHROCYTE [DISTWIDTH] IN BLOOD BY AUTOMATED COUNT: 13 % (ref 11.5–17)
GFR SERPLBLD CREATININE-BSD FMLA CKD-EPI: 8 MLS/MIN/1.73/M2
GLUCOSE SERPL-MCNC: 103 MG/DL (ref 82–115)
HCT VFR BLD AUTO: 29.1 % (ref 42–52)
HGB BLD-MCNC: 9.4 G/DL (ref 14–18)
IMM GRANULOCYTES # BLD AUTO: 0.03 X10(3)/MCL (ref 0–0.04)
IMM GRANULOCYTES NFR BLD AUTO: 0.3 %
LYMPHOCYTES # BLD AUTO: 1.6 X10(3)/MCL (ref 0.6–4.6)
LYMPHOCYTES NFR BLD AUTO: 17.6 %
MCH RBC QN AUTO: 30.7 PG (ref 27–31)
MCHC RBC AUTO-ENTMCNC: 32.3 G/DL (ref 33–36)
MCV RBC AUTO: 95.1 FL (ref 80–94)
MONOCYTES # BLD AUTO: 0.75 X10(3)/MCL (ref 0.1–1.3)
MONOCYTES NFR BLD AUTO: 8.2 %
NEUTROPHILS # BLD AUTO: 5.77 X10(3)/MCL (ref 2.1–9.2)
NEUTROPHILS NFR BLD AUTO: 63.4 %
NRBC BLD AUTO-RTO: 0 %
PLATELET # BLD AUTO: 194 X10(3)/MCL (ref 130–400)
PMV BLD AUTO: 10.8 FL (ref 7.4–10.4)
POCT GLUCOSE: 121 MG/DL (ref 70–110)
POTASSIUM SERPL-SCNC: 4.8 MMOL/L (ref 3.5–5.1)
RBC # BLD AUTO: 3.06 X10(6)/MCL (ref 4.7–6.1)
SODIUM SERPL-SCNC: 142 MMOL/L (ref 136–145)
WBC # SPEC AUTO: 9.1 X10(3)/MCL (ref 4.5–11.5)

## 2024-03-25 PROCEDURE — D9220A PRA ANESTHESIA: Mod: CRNA,,, | Performed by: NURSE ANESTHETIST, CERTIFIED REGISTERED

## 2024-03-25 PROCEDURE — 63600175 PHARM REV CODE 636 W HCPCS: Performed by: NURSE PRACTITIONER

## 2024-03-25 PROCEDURE — 82962 GLUCOSE BLOOD TEST: CPT | Performed by: SURGERY

## 2024-03-25 PROCEDURE — 85025 COMPLETE CBC W/AUTO DIFF WBC: CPT | Performed by: ANESTHESIOLOGY

## 2024-03-25 PROCEDURE — 71000015 HC POSTOP RECOV 1ST HR: Performed by: SURGERY

## 2024-03-25 PROCEDURE — 37000008 HC ANESTHESIA 1ST 15 MINUTES: Performed by: SURGERY

## 2024-03-25 PROCEDURE — 25000003 PHARM REV CODE 250: Performed by: NURSE PRACTITIONER

## 2024-03-25 PROCEDURE — C1750 CATH, HEMODIALYSIS,LONG-TERM: HCPCS | Performed by: SURGERY

## 2024-03-25 PROCEDURE — 49591 RPR AA HRN 1ST < 3 CM RDC: CPT | Mod: 51,,, | Performed by: SURGERY

## 2024-03-25 PROCEDURE — 71000016 HC POSTOP RECOV ADDL HR: Performed by: SURGERY

## 2024-03-25 PROCEDURE — 63600175 PHARM REV CODE 636 W HCPCS: Performed by: SURGERY

## 2024-03-25 PROCEDURE — 63600175 PHARM REV CODE 636 W HCPCS: Performed by: STUDENT IN AN ORGANIZED HEALTH CARE EDUCATION/TRAINING PROGRAM

## 2024-03-25 PROCEDURE — 27201423 OPTIME MED/SURG SUP & DEVICES STERILE SUPPLY: Performed by: SURGERY

## 2024-03-25 PROCEDURE — 25000003 PHARM REV CODE 250: Performed by: SURGERY

## 2024-03-25 PROCEDURE — 80048 BASIC METABOLIC PNL TOTAL CA: CPT | Performed by: ANESTHESIOLOGY

## 2024-03-25 PROCEDURE — 49326 LAP W/OMENTOPEXY ADD-ON: CPT | Mod: ,,, | Performed by: SURGERY

## 2024-03-25 PROCEDURE — 36000709 HC OR TIME LEV III EA ADD 15 MIN: Performed by: SURGERY

## 2024-03-25 PROCEDURE — 25000003 PHARM REV CODE 250: Performed by: STUDENT IN AN ORGANIZED HEALTH CARE EDUCATION/TRAINING PROGRAM

## 2024-03-25 PROCEDURE — 71000033 HC RECOVERY, INTIAL HOUR: Performed by: SURGERY

## 2024-03-25 PROCEDURE — 37000009 HC ANESTHESIA EA ADD 15 MINS: Performed by: SURGERY

## 2024-03-25 PROCEDURE — 36000708 HC OR TIME LEV III 1ST 15 MIN: Performed by: SURGERY

## 2024-03-25 PROCEDURE — 49324 LAP INSERT TUNNEL IP CATH: CPT | Mod: ,,, | Performed by: SURGERY

## 2024-03-25 PROCEDURE — D9220A PRA ANESTHESIA: Mod: ANES,,, | Performed by: ANESTHESIOLOGY

## 2024-03-25 DEVICE — IMPLANTABLE DEVICE: Type: IMPLANTABLE DEVICE | Site: ABDOMEN | Status: FUNCTIONAL

## 2024-03-25 RX ORDER — SODIUM CHLORIDE 0.9 % (FLUSH) 0.9 %
10 SYRINGE (ML) INJECTION
Status: DISCONTINUED | OUTPATIENT
Start: 2024-03-25 | End: 2024-03-25 | Stop reason: HOSPADM

## 2024-03-25 RX ORDER — FENTANYL CITRATE 50 UG/ML
INJECTION, SOLUTION INTRAMUSCULAR; INTRAVENOUS
Status: DISCONTINUED | OUTPATIENT
Start: 2024-03-25 | End: 2024-03-25

## 2024-03-25 RX ORDER — EPINEPHRINE 1 MG/ML
INJECTION, SOLUTION, CONCENTRATE INTRAVENOUS
Status: DISCONTINUED | OUTPATIENT
Start: 2024-03-25 | End: 2024-03-25

## 2024-03-25 RX ORDER — SODIUM CHLORIDE, SODIUM LACTATE, POTASSIUM CHLORIDE, CALCIUM CHLORIDE 600; 310; 30; 20 MG/100ML; MG/100ML; MG/100ML; MG/100ML
INJECTION, SOLUTION INTRAVENOUS CONTINUOUS
Status: ACTIVE | OUTPATIENT
Start: 2024-03-25

## 2024-03-25 RX ORDER — HEPARIN SODIUM 1000 [USP'U]/ML
INJECTION, SOLUTION INTRAVENOUS; SUBCUTANEOUS
Status: DISCONTINUED | OUTPATIENT
Start: 2024-03-25 | End: 2024-03-25 | Stop reason: HOSPADM

## 2024-03-25 RX ORDER — ACETAMINOPHEN 500 MG
1000 TABLET ORAL
Status: COMPLETED | OUTPATIENT
Start: 2024-03-25 | End: 2024-03-25

## 2024-03-25 RX ORDER — BUPIVACAINE HYDROCHLORIDE 5 MG/ML
INJECTION, SOLUTION EPIDURAL; INTRACAUDAL
Status: DISCONTINUED | OUTPATIENT
Start: 2024-03-25 | End: 2024-03-25 | Stop reason: HOSPADM

## 2024-03-25 RX ORDER — PROPOFOL 10 MG/ML
VIAL (ML) INTRAVENOUS
Status: DISCONTINUED | OUTPATIENT
Start: 2024-03-25 | End: 2024-03-25

## 2024-03-25 RX ORDER — MIDAZOLAM HYDROCHLORIDE 2 MG/2ML
2 INJECTION, SOLUTION INTRAMUSCULAR; INTRAVENOUS
Status: ACTIVE | OUTPATIENT
Start: 2024-03-25

## 2024-03-25 RX ORDER — ONDANSETRON HYDROCHLORIDE 2 MG/ML
INJECTION, SOLUTION INTRAMUSCULAR; INTRAVENOUS
Status: DISCONTINUED | OUTPATIENT
Start: 2024-03-25 | End: 2024-03-25

## 2024-03-25 RX ORDER — GLYCOPYRROLATE 0.2 MG/ML
INJECTION INTRAMUSCULAR; INTRAVENOUS
Status: DISCONTINUED | OUTPATIENT
Start: 2024-03-25 | End: 2024-03-25

## 2024-03-25 RX ORDER — ROCURONIUM BROMIDE 10 MG/ML
INJECTION, SOLUTION INTRAVENOUS
Status: DISCONTINUED | OUTPATIENT
Start: 2024-03-25 | End: 2024-03-25

## 2024-03-25 RX ORDER — ONDANSETRON 4 MG/1
4 TABLET, ORALLY DISINTEGRATING ORAL ONCE
Status: DISCONTINUED | OUTPATIENT
Start: 2024-03-25 | End: 2024-03-25 | Stop reason: HOSPADM

## 2024-03-25 RX ORDER — GABAPENTIN 300 MG/1
300 CAPSULE ORAL
Status: COMPLETED | OUTPATIENT
Start: 2024-03-25 | End: 2024-03-25

## 2024-03-25 RX ORDER — LIDOCAINE HYDROCHLORIDE 20 MG/ML
INJECTION, SOLUTION EPIDURAL; INFILTRATION; INTRACAUDAL; PERINEURAL
Status: DISCONTINUED | OUTPATIENT
Start: 2024-03-25 | End: 2024-03-25

## 2024-03-25 RX ORDER — ONDANSETRON 4 MG/1
4 TABLET, ORALLY DISINTEGRATING ORAL
Status: COMPLETED | OUTPATIENT
Start: 2024-03-25 | End: 2024-03-25

## 2024-03-25 RX ORDER — HYDROCODONE BITARTRATE AND ACETAMINOPHEN 7.5; 325 MG/1; MG/1
1 TABLET ORAL ONCE
Status: COMPLETED | OUTPATIENT
Start: 2024-03-25 | End: 2024-03-25

## 2024-03-25 RX ORDER — ENOXAPARIN SODIUM 100 MG/ML
40 INJECTION SUBCUTANEOUS
Status: COMPLETED | OUTPATIENT
Start: 2024-03-25 | End: 2024-03-25

## 2024-03-25 RX ORDER — DEXAMETHASONE SODIUM PHOSPHATE 4 MG/ML
INJECTION, SOLUTION INTRA-ARTICULAR; INTRALESIONAL; INTRAMUSCULAR; INTRAVENOUS; SOFT TISSUE
Status: DISCONTINUED | OUTPATIENT
Start: 2024-03-25 | End: 2024-03-25

## 2024-03-25 RX ADMIN — GLYCOPYRROLATE 0.2 MG: 0.2 INJECTION INTRAMUSCULAR; INTRAVENOUS at 01:03

## 2024-03-25 RX ADMIN — HYDROCODONE BITARTRATE AND ACETAMINOPHEN 1 TABLET: 7.5; 325 TABLET ORAL at 04:03

## 2024-03-25 RX ADMIN — DEXTROSE MONOHYDRATE 2 G: 5 INJECTION INTRAVENOUS at 01:03

## 2024-03-25 RX ADMIN — SODIUM CHLORIDE: 9 INJECTION, SOLUTION INTRAVENOUS at 01:03

## 2024-03-25 RX ADMIN — ENOXAPARIN SODIUM 40 MG: 100 INJECTION SUBCUTANEOUS at 08:03

## 2024-03-25 RX ADMIN — FENTANYL CITRATE 50 MCG: 50 INJECTION, SOLUTION INTRAMUSCULAR; INTRAVENOUS at 01:03

## 2024-03-25 RX ADMIN — EPINEPHRINE 10 MCG: 1 INJECTION, SOLUTION, CONCENTRATE INTRAVENOUS at 01:03

## 2024-03-25 RX ADMIN — ACETAMINOPHEN 1000 MG: 500 TABLET ORAL at 08:03

## 2024-03-25 RX ADMIN — PROPOFOL 70 MG: 10 INJECTION, EMULSION INTRAVENOUS at 01:03

## 2024-03-25 RX ADMIN — GABAPENTIN 300 MG: 300 CAPSULE ORAL at 08:03

## 2024-03-25 RX ADMIN — DEXAMETHASONE SODIUM PHOSPHATE 4 MG: 4 INJECTION, SOLUTION INTRA-ARTICULAR; INTRALESIONAL; INTRAMUSCULAR; INTRAVENOUS; SOFT TISSUE at 01:03

## 2024-03-25 RX ADMIN — ROCURONIUM BROMIDE 50 MG: 10 SOLUTION INTRAVENOUS at 01:03

## 2024-03-25 RX ADMIN — LIDOCAINE HYDROCHLORIDE 80 MG: 20 INJECTION, SOLUTION EPIDURAL; INFILTRATION; INTRACAUDAL; PERINEURAL at 01:03

## 2024-03-25 RX ADMIN — ROCURONIUM BROMIDE 30 MG: 10 SOLUTION INTRAVENOUS at 01:03

## 2024-03-25 RX ADMIN — ONDANSETRON 4 MG: 4 TABLET, ORALLY DISINTEGRATING ORAL at 08:03

## 2024-03-25 RX ADMIN — SUGAMMADEX 400 MG: 100 INJECTION, SOLUTION INTRAVENOUS at 01:03

## 2024-03-25 RX ADMIN — ONDANSETRON 4 MG: 2 INJECTION INTRAMUSCULAR; INTRAVENOUS at 01:03

## 2024-03-25 NOTE — ANESTHESIA PROCEDURE NOTES
Intubation    Date/Time: 3/25/2024 1:09 PM  Location procedure was performed: [facility] ANESTHESIOLOGY    Performed by: Iain Renee MD  Authorized by: Vinod Toribio MD  Consent Done: Yes  Consent: Written consent obtained.  Consent given by: patient  Patient identity confirmed: , MRN and name  Indications: anesthesia.  Intubation method: video-assisted  Patient status: unconscious  Preoxygenation: mask  Laryngoscope size: fraser 4.  Tube size: 7.5 mm  Tube type: cuffed  Number of attempts: 1  Cricoid pressure: yes  Post-procedure assessment: chest rise and ETCO2 monitor  Breath sounds: clear and equal  Cuff inflated: yes  ETT to lip: 23 cm  Tube secured with: adhesive tape  Complications: No

## 2024-03-25 NOTE — ANESTHESIA PREPROCEDURE EVALUATION
03/25/2024  Leobardo Garcia is a 73 y.o., male with ----------------------------  Acid reflux  CAD (coronary artery disease)  CKD (chronic kidney disease), stage V  Colon cancer  Diabetes  Fatigue  Generalized rash  Heart attack  Heart murmur  HLD (hyperlipidemia)  HTN (hypertension)  Insomnia  Sleep apnea      Comment:  does not use Machine currently  Tinnitus, unspecified ear  Umbilical hernia without obstruction and without gangrene  Urinary urgency  Urine incontinence  Weakness    And ----------------------------  Cataract extraction  Colon resection  Colonoscopy w/ polypectomy  Coronary stent placement      Comment:  x3  Eye surgery      Comment:  cataract  Transposition of basilic vein      Comment:  Procedure: TRANSPOSITION, VEIN, BASILIC;  Surgeon:                Ady Munoz MD;  Location: [facility];  Service:                Peripheral Vascular;  Laterality: Left;  supraclavicular                block    Presents for left basilic vein transposition    Not yet on dialysis - did have some wheezing this morning in OPS so duoneb ordered - he does not use nebs at home    He feels well today and [place]5 hours away, [place] [facility]  Pre-op Assessment    I have reviewed the Patient Summary Reports.     I have reviewed the Nursing Notes. I have reviewed the NPO Status.   I have reviewed the Medications.     Review of Systems  Cardiovascular:     Hypertension  Past MI CAD                  Coronary Artery Disease:          Hx of Myocardial Infarction                  Hypertension         Pulmonary:        Sleep Apnea     Obstructive Sleep Apnea (LAZARO).           Renal/:  Chronic Renal Disease, ESRD        Kidney Function/Disease             Hepatic/GI:     GERD      Gerd          Neurological:  Neurology Normal                                      Endocrine:  Diabetes    Diabetes                          Latest Reference Range & Units 01/17/24 09:45   Sodium 136 - 145 mmol/L 147 (H)   Potassium 3.5 - 5.1 mmol/L 4.5   Chloride 98 - 107 mmol/L 112 (H)   CO2 23 - 31 mmol/L 22 (L)   Anion Gap mEq/L 13.0   BUN 8.4 - 25.7 mg/dL 72.4 (H)   Creatinine 0.73 - 1.18 mg/dL 6.10 (H)   BUN/CREAT RATIO  12   eGFR mls/min/1.73/m2 9   Glucose 82 - 115 mg/dL 122 (H)   Calcium 8.8 - 10.0 mg/dL 8.2 (L)   (H): Data is abnormally high  (L): Data is abnormally low    Physical Exam  General: Well nourished, Cooperative, Alert and Oriented    Airway:  Mallampati: III   Mouth Opening: Normal  TM Distance: Normal  Tongue: Normal  Neck ROM: Normal ROM  Neck: Girth Increased    Dental:  Edentulous    Chest/Lungs:  Clear to auscultation, Normal Respiratory Rate  expiratory wheezes  Occasional expiratory wheeze on auscultation  Room Air saturations mid-90s so O2 added for block placemnet  Heart:  Rate: Normal  Rhythm: Regular Rhythm      Anesthesia Plan  Type of Anesthesia, risks & benefits discussed:    Anesthesia Type: Gen ETT  Intra-op Monitoring Plan: Standard ASA Monitors  Post Op Pain Control Plan: multimodal analgesia  Induction:  IV  Airway Plan: Direct, Post-Induction  Informed Consent: Informed consent signed with the Patient and all parties understand the risks and agree with anesthesia plan.  All questions answered.   ASA Score: 4  Day of Surgery Review of History & Physical: H&P Update referred to the surgeon/provider.  Anesthesia Plan Notes: Premedication: Midazolam for block  Duoneb given in OPS - may need repeat neb prior to discharge  Regional: Supraclavicular vs interscalene nerve block for surgical anesthesia - Ropiv 0.5% 30mls  Special Technique: Preemptive analgesia with neurontin, zofran, acetaminophen and celebrex or tramadol  Nasal Cannula vs O2 via facemask- consider Supernova Nasal CPAP for obese or LAZARO patients  PONV prophylaxis  Pt may go directly to Phase 2 if criteria met    Ready For Surgery From  Anesthesia Perspective.     .

## 2024-03-25 NOTE — DISCHARGE SUMMARY
[facility] - Periop Services  Discharge Note  Short Stay    Procedure(s) (LRB):  INSERTION, CATHETER, DIALYSIS, PERITONEAL, LAPAROSCOPIC (N/A)  REPAIR, HERNIA, VENTRAL, LAPAROSCOPIC (N/A)      OUTCOME: Patient tolerated treatment/procedure well without complication and is now ready for discharge.    DISPOSITION: Home or Self Care    FINAL DIAGNOSIS:  <principal problem not specified>    FOLLOWUP: In clinic    DISCHARGE INSTRUCTIONS:  No lifting more than 10 lb for 4 weeks, you may resume your typical diet, catheter should only be flushed at the direction of your nephrologist or dialysis coordinator, keep catheter clean and dry     Clinical Reference Documents Added to Patient Instructions         Document    ABDOMINAL HERNIA REPAIR, LAPAROSCOPIC SURGERY (ENGLISH)    CONSTIPATION IN ADULTS (ENGLISH)    HERNIA REPAIR DISCHARGE INSTRUCTIONS (ENGLISH)    PERITONEAL DIALYSIS CATHETER PLACEMENT DISCHARGE INSTRUCTIONS (ENGLISH)            TIME SPENT ON DISCHARGE:  15 minutes minutes

## 2024-03-25 NOTE — DISCHARGE INSTRUCTIONS
DISCHARGE INSTRUCTIONS:  No lifting more than 10 lb for 4 weeks, you may resume your typical diet, catheter should only be flushed at the direction of your nephrologist or dialysis coordinator, keep catheter clean and dry     NO DRIVING AND NO ALCOHOL consumption FOR 24 HOURS or while taking narcotic pain medications.    Keep sites CLEAN AND DRY. Do NOT submerge sites under water. Do not apply lotions, creams, or ointments.     NO heavy lifting. DO NOT lift objects greater than 10 lbs. Your doctor will advise at your follow up appointment when to resume normal activity.     You may have received Exparel (indicated by the BLUE armband) which is an injection used to ease pain at the surgery site. This drug may cause short-term loss of feeling and motor activity in the body. This may last for up to 5 days. Please see attached for additional information.     Monitor for infection: chills, fever (100.4 F), redness or drainage at surgical site. Notify your doctor if any of these occur.     Report to your nearest ER if you experience any SUDDEN/SEVERE abdominal pain, trouble breathing, uncontrolled pain, profound weakness.     BLEEDING: if you experience uncontrollable bleeding, contact your doctor or go to ER    NAUSEA: due to the anesthesia, you may experience nausea for up to 24 hours. If nausea and vomiting last longer, contact your doctor.     INFECTION:  watch for any signs or symptoms of infection such as chills, fever, redness or drainage at surgical site. Notify your doctor     PAIN : take your pain medications as directed. If the pain medications are not helping, notify your doctor.

## 2024-03-25 NOTE — OP NOTE
Date of operation:  March 25th, 2024     Surgeon:Theron Cantu MD    Co-surgeon:None    Operation:    1. Laparoscopic repair of ventral incisional hernia  2. Laparoscopic placement of peritoneal dialysis catheter  3. Laparoscopic omental pexy     Indications:  73-year-old male with CKD stage 4, he was in need of a peritoneal dialysis catheter.  He was referred to me by his treating nephrologist, he already has a arteriovenous fistula but is not currently on hemodialysis.    Preop diagnosis:  End-stage renal disease, CKD stage 4, ventral incisional hernia    Postop diagnosis:  Same    Specimens:  None    Anesthesia: General endotracheal     Blood loss:  5 cc     Complications:  None     Findings:  4 mm ventral incisional hernia at umbilicus    Disposition:  Stable satisfactory to PACU     Details of operation:  Patient was brought to the operating room laid supine on the operating table, general anesthesia was administered he was intubated endotracheally     The abdomen is prepped and draped in usual sterile fashion     A 5 mm Optiview trocar was placed 2 fingerbreadths inferior to the left costal margin at the midclavicular line, pneumoperitoneum was achieved, there was no injury to intra-abdominal structures of the placement of port     A left flank 5 mm port was placed    6 cm right lateral to the umbilicus, an 11 mm horizontal incision was made, a 11 mm trocar was placed through this incision and tunneled along the posterior rectus sheath in the direction towards the pelvis, this trocar trocar essentially created a tunneled through which we placed the dual tissue cuffed peritoneal dialysis catheter, we left the disc the distal larger tissue cuff within the rectus abdominis muscle, the tail of the catheter was tunneled across the patient's midline with the exit in the left upper quadrant, the proximal smaller tissue cuff was left within the subcutaneous fat      The distal tissue cuff was left within the rectus  abdominis muscle     To prevent unwanted migration of the catheter, a stab incision was made in the suprapubic region through which a laparoscopic suture Passer was used to pexy the catheter in the patient's midline with the deep pelvis , this was achieved with a 2-0 nylon suture and a granny needle    The patient had a large amount of omentum that easily reached all the way down to the pelvis, I therefore performed an omental pexy, I used a 0 Ethibond suture to pexy the the greater omentum to the right upper quadrant which ought to help prevent early clotting of the peritoneal dialysis catheter       All ports were removed under direct laparoscopic vision, the catheter was flushed with a heparinized saline to prevent early clotting     There was a 4 mm ventral umbilical hernia, this was along the site of a prior abdominal incision     The defect was about 4 mm in size, a stab incision was made over the midline umbilical defect, a laparoscopic suture Passer was used to close this defect with a single figure-of-eight 0 Ethibond suture    This closed the defect completely         All skin incisions were closed with 4-0 subcuticular Vicryl sutures and sterile dressings     Patient tolerated procedure well was extubated and brought to the PACU in stable satisfactory condition thank you

## 2024-03-26 VITALS
SYSTOLIC BLOOD PRESSURE: 165 MMHG | BODY MASS INDEX: 28.59 KG/M2 | DIASTOLIC BLOOD PRESSURE: 74 MMHG | HEIGHT: 76 IN | OXYGEN SATURATION: 95 % | TEMPERATURE: 98 F | HEART RATE: 60 BPM | WEIGHT: 234.81 LBS | RESPIRATION RATE: 18 BRPM

## 2024-03-26 NOTE — ANESTHESIA POSTPROCEDURE EVALUATION
Anesthesia Post Evaluation    Patient: Leobardo Garcia    Procedure(s) Performed: Procedure(s) (LRB):  INSERTION, CATHETER, DIALYSIS, PERITONEAL, LAPAROSCOPIC (N/A)  REPAIR, HERNIA, VENTRAL, LAPAROSCOPIC (N/A)    Final Anesthesia Type: general      Patient location during evaluation: PACU  Patient participation: Yes- Able to Participate  Level of consciousness: awake  Post-procedure vital signs: reviewed and stable  Pain management: adequate  Airway patency: patent  LAZARO mitigation strategies: Postoperative administration of CPAP, nasopharyngeal airway, or oral appliance in the postanesthesia care unit (PACU)  PONV status at discharge: No PONV  Anesthetic complications: no      Cardiovascular status: hemodynamically stable  Respiratory status: unassisted and spontaneous ventilation  Hydration status: euvolemic  Follow-up not needed.          Vitals Value Taken Time   /74 03/25/24 1556   Temp 36.4 °C (97.5 °F) 03/25/24 1440   Pulse 60 03/25/24 1603   Resp 18 03/25/24 1602   SpO2 95 % 03/25/24 1603         Event Time   Out of Recovery 14:40:00         Pain/Starr Score: Pain Rating Prior to Med Admin: 5 (3/25/2024  4:02 PM)  Starr Score: 10 (3/25/2024  4:19 PM)  Modified Starr Score: 18 (3/25/2024  4:19 PM)

## 2024-03-28 NOTE — ADDENDUM NOTE
Addendum  created 03/28/24 1209 by Abigail Roa MD    Child order released for a procedure order, Clinical Note Signed, Intraprocedure Blocks edited, LDA created via procedure documentation, SmartForm saved

## 2024-03-28 NOTE — ANESTHESIA PROCEDURE NOTES
Intubation    Date/Time: 3/25/2024 1:09 PM    Performed by: Abigail Roa MD  Authorized by: Vinod Toribio MD    Intubation:     Mask Ventilation:  Easy mask    Difficult Airway Encountered?: No    Notes:      Note transcribed by Dr. RUFINA Roa from inadvertant ad-hoc intubation note    Last edited 24 1337 by Iain Renee MD  Creation Time 24 1335  Status: Signed      Intubation     Date/Time: 3/25/2024 1:09 PM  Location procedure was performed: [facility] ANESTHESIOLOGY     Performed by: Iain Renee MD  Authorized by: Vinod Toribio MD  Consent Done: Yes  Consent: Written consent obtained.  Consent given by: patient  Patient identity confirmed: , MRN and name  Indications: anesthesia.  Intubation method: video-assisted  Patient status: unconscious  Preoxygenation: mask  Laryngoscope size: fraser 4.  Tube size: 7.5 mm  Tube type: cuffed  Number of attempts: 1  Cricoid pressure: yes  Post-procedure assessment: chest rise and ETCO2 monitor  Breath sounds: clear and equal  Cuff inflated: yes  ETT to lip: 23 cm  Tube secured with: adhesive tape  Complications: No

## 2024-04-08 NOTE — PROGRESS NOTES
Patient ID: [id number]   Chief Complaint: Post-op Evaluation (Lap ventral hernia with PD cath 03/25/24)    HPI:     Leobardo Garcia is a 73 y.o. male here for post op visit after placement on PD cath, ventral incisional hernia, and omental pexy on 3/25/2024. Pt's incision is healing well, no fever/chills  But does have superficial infection surrounding PD cath. No abscess, drainage noted. Did use cath for low volume dialysis the last 5 days and wife notes that his drainage has been clear.   Pt currently on doxycycline per nephrologist and has 2 days left of medication. No issues with Bms, nausea, vomiting.   Patient Care Team:  Bassam Birmingham III, MD as PCP - General (Family Medicine)  Ian Berger MD (Nephrology)  Keily Spann MD (Cardiovascular Disease)  Theron Cantu MD as Surgeon (General Surgery)   Past Medical History:   Diagnosis Date    Acid reflux     CAD (coronary artery disease)     CKD (chronic kidney disease), stage V     Colon cancer     Diabetes     Dialysis patient     Fatigue     Generalized rash     Heart attack 2016    Heart murmur     HLD (hyperlipidemia)     HTN (hypertension)     Insomnia     Skin rash     Sleep apnea     does not use Machine currently    Tinnitus, unspecified ear     Umbilical hernia without obstruction and without gangrene     Urinary urgency     Urine incontinence     Weakness       Past Surgical History:   Procedure Laterality Date    AV FISTULA PLACEMENT  01/17/2024    CATARACT EXTRACTION Bilateral     COLON RESECTION  2006    COLONOSCOPY W/ POLYPECTOMY      CORONARY STENT PLACEMENT      x3    EYE SURGERY Bilateral     cataract    INSERTION, CATHETER, DIALYSIS, PERITONEAL, LAPAROSCOPIC N/A 3/25/2024    Procedure: INSERTION, CATHETER, DIALYSIS, PERITONEAL, LAPAROSCOPIC;  Surgeon: Theron Cantu MD;  Location: [facility];  Service: General;  Laterality: N/A;  lap PD cath placement    LAPAROSCOPIC REPAIR OF VENTRAL HERNIA N/A 3/25/2024     Procedure: REPAIR, HERNIA, VENTRAL, LAPAROSCOPIC;  Surgeon: Theron Cantu MD;  Location: [facility];  Service: General;  Laterality: N/A;  lap ventral possible lysis of adhesions with   lap PD cath placement    TRANSPOSITION OF BASILIC VEIN Left 01/17/2024    Procedure: TRANSPOSITION, VEIN, BASILIC;  Surgeon: Ady Munoz MD;  Location: [facility];  Service: Peripheral Vascular;  Laterality: Left;  supraclavicular block      Social History     Tobacco Use    Smoking status: Some Days     Current packs/day: 0.50     Types: Cigarettes    Smokeless tobacco: Never   Substance and Sexual Activity    Alcohol use: Never    Drug use: Never    Sexual activity: Not on file      Current Outpatient Medications   Medication Instructions    amoxicillin-clavulanate 875-125mg (AUGMENTIN) 875-125 mg per tablet 1 tablet, Oral, 2 times daily    ascorbic acid (vitamin C) (VITAMIN C) 500 mg, Oral, Daily    aspirin (ECOTRIN) 81 mg, Oral, Daily    atorvastatin (LIPITOR) 40 mg, Oral, Nightly    b complex vitamins tablet 1 tablet, Oral, Daily    carvediloL (COREG) 25 mg, Oral, 2 times daily    clobetasoL (TEMOVATE) 0.05 % cream Topical (Top), 2 times daily    doxazosin (CARDURA) 4 mg, Oral, 2 times daily    empagliflozin (JARDIANCE) 10 mg, Oral, Daily    eszopiclone (LUNESTA) 3 mg, Oral, Nightly    finasteride (PROSCAR) 5 mg, Oral, Daily    fluorometholone 0.1% (FML) 0.1 % DrpS 1 drop, Both Eyes, 2 times daily    folic acid (FOLVITE) 800 mcg, Oral, Daily    hydrOXYzine HCL (ATARAX) 25 mg, Oral, Nightly    lactulose (CHRONULAC) 10 gram/15 mL solution SMARTSIG:15 Milliliter(s) By Mouth Once a Week PRN    melatonin (MELATIN) 5 mg, Oral, Nightly    mupirocin (BACTROBAN) 2 % ointment Topical (Top)    NIFEdipine (ADALAT CC) 30 mg, Oral    olmesartan (BENICAR) 40 mg, Oral, Daily    sevelamer carbonate (RENVELA) 800 mg, Oral, 3 times daily    torsemide (DEMADEX) 50 mg, Oral    TRADJENTA 5 mg, Oral, Every morning  "   TRESIBA FLEXTOUCH U-200 20 Units, Subcutaneous, 3 times daily    VELTASSA 8.4 g, Oral, Every other day    vitamin D (VITAMIN D3) 1,000 Units, Oral, Daily     Review of patient's allergies indicates:   Allergen Reactions    Lisinopril Other (See Comments)     Other Reaction(s): Cough        Subjective:     12 point review of systems conducted, negative except as stated in the history of present illness. See HPI for details.    Objective:     Visit Vitals  BP (!) 159/70   Pulse 69   Ht 6' 4" (1.93 m)   Wt 106.3 kg (234 lb 6.4 oz)   BMI 28.53 kg/m²     Physical Exam:  General:  Alert and oriented.    Integumentary:  Warm, Dry, Pink. Surgical incisions healing well. PD cath in place without issues, dressing in place as well.   Neurologic:  Alert, Oriented.    Psychiatric:  Cooperative.      Assessment:       ICD-10-CM ICD-9-CM   1. Postoperative visit  Z48.89 V58.49   2. Superficial bacterial skin infection  L08.9 682.9    B96.89    3. CKD (chronic kidney disease) requiring chronic dialysis  N18.6 585.6    Z99.2 V45.11        Plan:     1. Postoperative visit    2. Superficial bacterial skin infection    3. CKD (chronic kidney disease) requiring chronic dialysis    Other orders  -     amoxicillin-clavulanate 875-125mg (AUGMENTIN) 875-125 mg per tablet; Take 1 tablet by mouth 2 (two) times daily.  Dispense: 14 tablet; Refill: 0     - rx of augmentin given to pt  - discussed with pt and his wife that if pt has any fever, nausea, vomiting, abd pain, dialysis fluid looks purulent he needs to be seen in the er. They verbalized understanding.   - f/u PRN  - no lifting greater than 15lbs for 2 weeks  - continue to f/u with nephrology and take PD cath education class   - no submerging PD cath in water of any kind  - continue to look out for any redness, fever, swelling, foul odor, or drainage from site, abd pain, significant nausea or vomiting and if begins please call the office.     Future Appointments   Date Time " Provider Department Center   6/11/2024 10:40 AM Ady Munoz MD [facility]      MEGHAN LockP

## 2024-04-09 ENCOUNTER — OFFICE VISIT (OUTPATIENT)
Dept: SURGERY | Facility: CLINIC | Age: 74
End: 2024-04-09

## 2024-04-09 ENCOUNTER — TELEPHONE (OUTPATIENT)
Dept: SURGERY | Facility: CLINIC | Age: 74
End: 2024-04-09

## 2024-04-09 VITALS
SYSTOLIC BLOOD PRESSURE: 159 MMHG | WEIGHT: 234.38 LBS | HEART RATE: 69 BPM | DIASTOLIC BLOOD PRESSURE: 70 MMHG | HEIGHT: 76 IN | BODY MASS INDEX: 28.54 KG/M2

## 2024-04-09 DIAGNOSIS — B96.89 SUPERFICIAL BACTERIAL SKIN INFECTION: ICD-10-CM

## 2024-04-09 DIAGNOSIS — N18.6 CKD (CHRONIC KIDNEY DISEASE) REQUIRING CHRONIC DIALYSIS: ICD-10-CM

## 2024-04-09 DIAGNOSIS — Z48.89 POSTOPERATIVE VISIT: Primary | ICD-10-CM

## 2024-04-09 DIAGNOSIS — Z99.2 CKD (CHRONIC KIDNEY DISEASE) REQUIRING CHRONIC DIALYSIS: ICD-10-CM

## 2024-04-09 DIAGNOSIS — L08.9 SUPERFICIAL BACTERIAL SKIN INFECTION: ICD-10-CM

## 2024-04-09 PROCEDURE — 99024 POSTOP FOLLOW-UP VISIT: CPT | Mod: ,,, | Performed by: NURSE PRACTITIONER

## 2024-04-09 RX ORDER — MUPIROCIN 20 MG/G
OINTMENT TOPICAL
COMMUNITY
Start: 2024-04-04

## 2024-04-09 RX ORDER — LACTULOSE 10 G/15ML
SOLUTION ORAL; RECTAL
COMMUNITY
Start: 2024-04-05

## 2024-04-09 RX ORDER — AMOXICILLIN AND CLAVULANATE POTASSIUM 875; 125 MG/1; MG/1
1 TABLET, FILM COATED ORAL 2 TIMES DAILY
Qty: 14 TABLET | Refills: 0 | Status: SHIPPED | OUTPATIENT
Start: 2024-04-09

## 2024-04-09 RX ORDER — CLOBETASOL PROPIONATE 0.5 MG/G
CREAM TOPICAL 2 TIMES DAILY
COMMUNITY
Start: 2024-04-02

## 2024-04-09 RX ORDER — NIFEDIPINE 30 MG/1
30 TABLET, FILM COATED, EXTENDED RELEASE ORAL
COMMUNITY
Start: 2024-03-28

## 2024-04-09 RX ORDER — TORSEMIDE 100 MG/1
50 TABLET ORAL
COMMUNITY
Start: 2024-03-28

## 2024-04-16 ENCOUNTER — TELEPHONE (OUTPATIENT)
Dept: SURGERY | Facility: CLINIC | Age: 74
End: 2024-04-16
Payer: COMMERCIAL

## 2024-04-16 NOTE — TELEPHONE ENCOUNTER
Called and spoke with wife and his home health nurse that was at his home. They will leave the medicated dressing off for a few days. I believe it is the occlusive dressing that they are placing on top of the wound that is causing it too much moisture and he is reacting to the bandage and adhesive. They are to just place gauze around it with Mediport tape. Continue antibiotics. Drain is flushing at this time but not putting out. Pt to do a bowl prep today. Again he denies any other symptoms of infection such as nausea, vomiting, fever, chills, etc.

## 2024-04-16 NOTE — TELEPHONE ENCOUNTER
Spoke with pts wife   She stated that his infection is not getting any worse but its not that much better either.  The redness is still there, under the cath is improving, at the outer part is is still red. He is still on his antibiotics BID.   No sure if you had any other recommendations.

## 2024-04-23 NOTE — TELEPHONE ENCOUNTER
Called pt to see how he is healing, pts wife stated that it is much better the redness is just about gone he has no pain on discomfort at this time. They are now home and cleansing area daily and placing a dry gauze on the site.

## (undated) DEVICE — SYR 10CC LUER LOCK

## (undated) DEVICE — SUT SILK 3-0 SH 18IN BLACK

## (undated) DEVICE — BOWL STERILE LARGE 32OZ

## (undated) DEVICE — CANNULA ENDOPATH XCEL 5X100MM

## (undated) DEVICE — NDL SPINAL 22GA 3.5 IN QUINCKE

## (undated) DEVICE — TROCAR ENDOPATH XCEL 11MM 10CM

## (undated) DEVICE — KIT SURGICAL TURNOVER

## (undated) DEVICE — GLOVE PROTEXIS HYDROGEL SZ6.5

## (undated) DEVICE — NDL HYPO REG 25G X 1 1/2

## (undated) DEVICE — BLADE SURG STAINLESS STEEL #11

## (undated) DEVICE — SUT 3-0 12-18IN SILK

## (undated) DEVICE — SYR 3CC LUER LOC

## (undated) DEVICE — SUT PROLENE 6-0 BV-1 30IN

## (undated) DEVICE — SOL NACL IRR 1000ML BTL

## (undated) DEVICE — SUT MCRYL PLUS 4-0 PS2 27IN

## (undated) DEVICE — CUP PROFEX GLASS GRADUATE 1OZ

## (undated) DEVICE — SUT VICRYL 3-0 27 SH

## (undated) DEVICE — SUT ETHIBOND 0 CR/MO-7 8-18

## (undated) DEVICE — COVER PROBE US 5.5X58L NON LTX

## (undated) DEVICE — TROCAR ENDOPATH XCEL 5X100MM

## (undated) DEVICE — PAD PREP 50/CA

## (undated) DEVICE — BAG MEDI-PLAST DECANTER C-FLOW

## (undated) DEVICE — SUT 2-0 12-18IN SILK

## (undated) DEVICE — MARKER FN REG DUAL UTIL RULER

## (undated) DEVICE — APPLICATOR CHLORAPREP ORN 26ML

## (undated) DEVICE — ADHESIVE DERMABOND ADVANCED

## (undated) DEVICE — SUT VICRYL PLUS 3-0 SH 18IN

## (undated) DEVICE — Device

## (undated) DEVICE — SUT VICRYL PLUS 4-0 FS-2 27IN

## (undated) DEVICE — NDL HYPO 22GX1 1/2 SYR 10ML LL

## (undated) DEVICE — WARMER DRAPE STERILE LF

## (undated) DEVICE — SUT 4-0 12-18IN SILK BLACK

## (undated) DEVICE — GLOVE PROTEXIS HYDROGEL SZ7.5

## (undated) DEVICE — SOL IRR NACL .9% 1000CC

## (undated) DEVICE — KIT GEN LAPAROSCOPY LAFAYETTE

## (undated) DEVICE — STRIP MEDI WND CLSR 1/2X4IN

## (undated) DEVICE — ELECTRODE PATIENT RETURN DISP

## (undated) DEVICE — SOL IRRI STRL WATER 1000ML

## (undated) DEVICE — DRESSING TELFA N ADH 3X8IN

## (undated) DEVICE — SUT CTD VICRYL PLUS 4/0

## (undated) DEVICE — DRAPE INCISE IOBAN 2 23X23IN

## (undated) DEVICE — SYR 30CC LUER LOCK